# Patient Record
Sex: FEMALE | Race: BLACK OR AFRICAN AMERICAN | NOT HISPANIC OR LATINO | URBAN - METROPOLITAN AREA
[De-identification: names, ages, dates, MRNs, and addresses within clinical notes are randomized per-mention and may not be internally consistent; named-entity substitution may affect disease eponyms.]

---

## 2018-07-14 ENCOUNTER — EMERGENCY (EMERGENCY)
Facility: HOSPITAL | Age: 23
LOS: 0 days | Discharge: HOME | End: 2018-07-15
Attending: EMERGENCY MEDICINE
Payer: COMMERCIAL

## 2018-07-14 ENCOUNTER — EMERGENCY (EMERGENCY)
Facility: HOSPITAL | Age: 23
LOS: 0 days | Discharge: HOME | End: 2018-07-14
Attending: EMERGENCY MEDICINE
Payer: COMMERCIAL

## 2018-07-14 VITALS
SYSTOLIC BLOOD PRESSURE: 132 MMHG | RESPIRATION RATE: 17 BRPM | HEART RATE: 118 BPM | HEIGHT: 64 IN | OXYGEN SATURATION: 100 % | WEIGHT: 134.92 LBS | DIASTOLIC BLOOD PRESSURE: 73 MMHG | TEMPERATURE: 99 F

## 2018-07-14 VITALS
OXYGEN SATURATION: 100 % | TEMPERATURE: 98 F | SYSTOLIC BLOOD PRESSURE: 135 MMHG | RESPIRATION RATE: 20 BRPM | HEART RATE: 100 BPM | DIASTOLIC BLOOD PRESSURE: 71 MMHG

## 2018-07-14 DIAGNOSIS — R53.1 WEAKNESS: ICD-10-CM

## 2018-07-14 DIAGNOSIS — Z88.0 ALLERGY STATUS TO PENICILLIN: ICD-10-CM

## 2018-07-14 DIAGNOSIS — R55 SYNCOPE AND COLLAPSE: ICD-10-CM

## 2018-07-14 DIAGNOSIS — Z79.52 LONG TERM (CURRENT) USE OF SYSTEMIC STEROIDS: ICD-10-CM

## 2018-07-14 DIAGNOSIS — J06.9 ACUTE UPPER RESPIRATORY INFECTION, UNSPECIFIED: ICD-10-CM

## 2018-07-14 DIAGNOSIS — J45.909 UNSPECIFIED ASTHMA, UNCOMPLICATED: ICD-10-CM

## 2018-07-14 DIAGNOSIS — Z79.51 LONG TERM (CURRENT) USE OF INHALED STEROIDS: ICD-10-CM

## 2018-07-14 DIAGNOSIS — Z98.89 OTHER SPECIFIED POSTPROCEDURAL STATES: Chronic | ICD-10-CM

## 2018-07-14 DIAGNOSIS — R06.02 SHORTNESS OF BREATH: ICD-10-CM

## 2018-07-14 DIAGNOSIS — E86.0 DEHYDRATION: ICD-10-CM

## 2018-07-14 DIAGNOSIS — R05 COUGH: ICD-10-CM

## 2018-07-14 DIAGNOSIS — J02.9 ACUTE PHARYNGITIS, UNSPECIFIED: ICD-10-CM

## 2018-07-14 LAB
BASOPHILS # BLD AUTO: 0.02 K/UL — SIGNIFICANT CHANGE UP (ref 0–0.2)
BASOPHILS NFR BLD AUTO: 0.2 % — SIGNIFICANT CHANGE UP (ref 0–2)
EOSINOPHIL # BLD AUTO: 0 K/UL — SIGNIFICANT CHANGE UP (ref 0–0.5)
EOSINOPHIL NFR BLD AUTO: 0 % — SIGNIFICANT CHANGE UP (ref 0–6)
HCT VFR BLD CALC: 40.4 % — SIGNIFICANT CHANGE UP (ref 34.5–45)
HGB BLD-MCNC: 13.9 G/DL — SIGNIFICANT CHANGE UP (ref 11.5–15.5)
IMM GRANULOCYTES NFR BLD AUTO: 0.2 % — SIGNIFICANT CHANGE UP (ref 0–1.5)
LYMPHOCYTES # BLD AUTO: 1.34 K/UL — SIGNIFICANT CHANGE UP (ref 1–3.3)
LYMPHOCYTES # BLD AUTO: 10.6 % — LOW (ref 13–44)
MCHC RBC-ENTMCNC: 30.4 PG — SIGNIFICANT CHANGE UP (ref 27–34)
MCHC RBC-ENTMCNC: 34.4 GM/DL — SIGNIFICANT CHANGE UP (ref 32–36)
MCV RBC AUTO: 88.4 FL — SIGNIFICANT CHANGE UP (ref 80–100)
MONOCYTES # BLD AUTO: 0.96 K/UL — HIGH (ref 0–0.9)
MONOCYTES NFR BLD AUTO: 7.6 % — SIGNIFICANT CHANGE UP (ref 2–14)
NEUTROPHILS # BLD AUTO: 10.24 K/UL — HIGH (ref 1.8–7.4)
NEUTROPHILS NFR BLD AUTO: 81.4 % — HIGH (ref 43–77)
NRBC # BLD: 0 /100 WBCS — SIGNIFICANT CHANGE UP (ref 0–0)
PLATELET # BLD AUTO: 304 K/UL — SIGNIFICANT CHANGE UP (ref 150–400)
RBC # BLD: 4.57 M/UL — SIGNIFICANT CHANGE UP (ref 3.8–5.2)
RBC # FLD: 12.4 % — SIGNIFICANT CHANGE UP (ref 10.3–14.5)
WBC # BLD: 12.59 K/UL — HIGH (ref 3.8–10.5)
WBC # FLD AUTO: 12.59 K/UL — HIGH (ref 3.8–10.5)

## 2018-07-14 PROCEDURE — 99285 EMERGENCY DEPT VISIT HI MDM: CPT

## 2018-07-14 PROCEDURE — 99283 EMERGENCY DEPT VISIT LOW MDM: CPT | Mod: 25

## 2018-07-14 PROCEDURE — 71046 X-RAY EXAM CHEST 2 VIEWS: CPT | Mod: 26

## 2018-07-14 RX ORDER — AZITHROMYCIN 500 MG/1
1 TABLET, FILM COATED ORAL
Qty: 4 | Refills: 0
Start: 2018-07-14 | End: 2018-07-17

## 2018-07-14 RX ORDER — ALBUTEROL 90 UG/1
3 AEROSOL, METERED ORAL
Qty: 30 | Refills: 0
Start: 2018-07-14

## 2018-07-14 RX ORDER — DEXAMETHASONE 0.5 MG/5ML
10 ELIXIR ORAL ONCE
Qty: 0 | Refills: 0 | Status: COMPLETED | OUTPATIENT
Start: 2018-07-14 | End: 2018-07-14

## 2018-07-14 RX ORDER — SODIUM CHLORIDE 9 MG/ML
3 INJECTION INTRAMUSCULAR; INTRAVENOUS; SUBCUTANEOUS ONCE
Qty: 0 | Refills: 0 | Status: COMPLETED | OUTPATIENT
Start: 2018-07-14 | End: 2018-07-14

## 2018-07-14 RX ORDER — AZITHROMYCIN 500 MG/1
500 TABLET, FILM COATED ORAL ONCE
Qty: 0 | Refills: 0 | Status: COMPLETED | OUTPATIENT
Start: 2018-07-14 | End: 2018-07-14

## 2018-07-14 RX ORDER — SODIUM CHLORIDE 9 MG/ML
1000 INJECTION INTRAMUSCULAR; INTRAVENOUS; SUBCUTANEOUS ONCE
Qty: 0 | Refills: 0 | Status: COMPLETED | OUTPATIENT
Start: 2018-07-14 | End: 2018-07-14

## 2018-07-14 RX ADMIN — Medication 10 MILLIGRAM(S): at 01:44

## 2018-07-14 RX ADMIN — SODIUM CHLORIDE 3 MILLILITER(S): 9 INJECTION INTRAMUSCULAR; INTRAVENOUS; SUBCUTANEOUS at 23:39

## 2018-07-14 RX ADMIN — SODIUM CHLORIDE 2000 MILLILITER(S): 9 INJECTION INTRAMUSCULAR; INTRAVENOUS; SUBCUTANEOUS at 23:35

## 2018-07-14 RX ADMIN — AZITHROMYCIN 500 MILLIGRAM(S): 500 TABLET, FILM COATED ORAL at 02:20

## 2018-07-14 NOTE — ED PROVIDER NOTE - NS ED ROS FT
Constitutional: (-) fever  (+)chills  (-)sweats  Eyes/ENT: (-) blurry vision, (-) epistaxis  (-)rhinorrhea   (+) sore throat    Cardiovascular: (-) chest pain, (-) palpitations (-) edema   Respiratory: (-) cough, (+) shortness of breath   Gastrointestinal: (-)nausea  (-)vomiting, (-) diarrhea  (-) abdominal pain   :  (-)dysuria, (-)frequency, (-)urgency, (-)hematuria  Musculoskeletal: (-) neck pain, (-) back pain, (-) joint pain  Integumentary: (-) rash, (-) edema  Neurological: (-) headache, (-) altered mental status  (-)LOC

## 2018-07-14 NOTE — ED PROVIDER NOTE - OBJECTIVE STATEMENT
22yoF; with pmh signif for Asthma; now p/w sob and cough and sore throat x1 week. intermittent fever. denies sob. denies palp. denies sick contacts. denies chest pain. denies abd pain. denies n/v/d  PMH: Asthma  SOCIAL: No tobacco/illicit substance use/EtOH

## 2018-07-14 NOTE — ED PROVIDER NOTE - PHYSICAL EXAMINATION
Gen: Alert, NAD  Head: NC, AT, PERRL, EOMI, normal lids/conjunctiva  ENT: B TM WNL, normal hearing, patent oropharynx with bilateral tonsillar enlargemenbt and erythema. no exudate, uvula midline  Neck: +supple, no tenderness/meningismus/JVD, +Trachea midline  Pulm: Bilateral BS, normal resp effort, no wheeze/stridor/retractions  CV: RRR, no M/R/G, +dist pulses  Abd: soft, NT/ND, +BS, no hepatosplenomegaly  Mskel: no edema/erythema/cyanosis  Skin: no rash  Neuro: AAOx3, no sensory/motor deficits

## 2018-07-14 NOTE — ED ADULT TRIAGE NOTE - CHIEF COMPLAINT QUOTE
BIBA.  pt c/o weakness. pt was seen in ED on yesterday and dsg with URI.  pt currently taking prednisone, albuterol, and zithromax, all taken today.  per EMS - pt was getting a neb treatment at home but was hyperventilating.  LMP 7/2/18

## 2018-07-15 VITALS
HEART RATE: 81 BPM | DIASTOLIC BLOOD PRESSURE: 64 MMHG | SYSTOLIC BLOOD PRESSURE: 134 MMHG | OXYGEN SATURATION: 100 % | TEMPERATURE: 98 F | RESPIRATION RATE: 17 BRPM

## 2018-07-15 LAB
ALBUMIN SERPL ELPH-MCNC: 3.5 G/DL — SIGNIFICANT CHANGE UP (ref 3.3–5)
ALP SERPL-CCNC: 54 U/L — SIGNIFICANT CHANGE UP (ref 40–120)
ALT FLD-CCNC: 15 U/L — SIGNIFICANT CHANGE UP (ref 12–78)
ANION GAP SERPL CALC-SCNC: 8 MMOL/L — SIGNIFICANT CHANGE UP (ref 5–17)
APTT BLD: 27.2 SEC — LOW (ref 27.5–37.4)
AST SERPL-CCNC: 11 U/L — LOW (ref 15–37)
BILIRUB SERPL-MCNC: 0.3 MG/DL — SIGNIFICANT CHANGE UP (ref 0.2–1.2)
BUN SERPL-MCNC: 9 MG/DL — SIGNIFICANT CHANGE UP (ref 7–23)
CALCIUM SERPL-MCNC: 8.8 MG/DL — SIGNIFICANT CHANGE UP (ref 8.5–10.1)
CHLORIDE SERPL-SCNC: 109 MMOL/L — HIGH (ref 96–108)
CK MB BLD-MCNC: <0.6 % — SIGNIFICANT CHANGE UP (ref 0–3.5)
CK MB CFR SERPL CALC: <0.5 NG/ML — SIGNIFICANT CHANGE UP (ref 0.5–3.6)
CK SERPL-CCNC: 83 U/L — SIGNIFICANT CHANGE UP (ref 26–192)
CO2 SERPL-SCNC: 25 MMOL/L — SIGNIFICANT CHANGE UP (ref 22–31)
CREAT SERPL-MCNC: 0.91 MG/DL — SIGNIFICANT CHANGE UP (ref 0.5–1.3)
D DIMER BLD IA.RAPID-MCNC: 1094 NG/ML DDU — HIGH
GLUCOSE SERPL-MCNC: 188 MG/DL — HIGH (ref 70–99)
HCG SERPL-ACNC: <1 MIU/ML — SIGNIFICANT CHANGE UP
INR BLD: 1.02 RATIO — SIGNIFICANT CHANGE UP (ref 0.88–1.16)
LACTATE SERPL-SCNC: 1.6 MMOL/L — SIGNIFICANT CHANGE UP (ref 0.7–2)
LACTATE SERPL-SCNC: 3.4 MMOL/L — HIGH (ref 0.7–2)
POTASSIUM SERPL-MCNC: 3.6 MMOL/L — SIGNIFICANT CHANGE UP (ref 3.5–5.3)
POTASSIUM SERPL-SCNC: 3.6 MMOL/L — SIGNIFICANT CHANGE UP (ref 3.5–5.3)
PROT SERPL-MCNC: 7.9 GM/DL — SIGNIFICANT CHANGE UP (ref 6–8.3)
PROTHROM AB SERPL-ACNC: 11.1 SEC — SIGNIFICANT CHANGE UP (ref 9.8–12.7)
SODIUM SERPL-SCNC: 142 MMOL/L — SIGNIFICANT CHANGE UP (ref 135–145)
TROPONIN I SERPL-MCNC: <.015 NG/ML — SIGNIFICANT CHANGE UP (ref 0.01–0.04)

## 2018-07-15 PROCEDURE — 71045 X-RAY EXAM CHEST 1 VIEW: CPT | Mod: 26

## 2018-07-15 PROCEDURE — 71275 CT ANGIOGRAPHY CHEST: CPT | Mod: 26

## 2018-07-15 RX ORDER — SODIUM CHLORIDE 9 MG/ML
1000 INJECTION INTRAMUSCULAR; INTRAVENOUS; SUBCUTANEOUS ONCE
Qty: 0 | Refills: 0 | Status: COMPLETED | OUTPATIENT
Start: 2018-07-15 | End: 2018-07-15

## 2018-07-15 RX ADMIN — SODIUM CHLORIDE 2000 MILLILITER(S): 9 INJECTION INTRAMUSCULAR; INTRAVENOUS; SUBCUTANEOUS at 00:05

## 2018-07-15 RX ADMIN — SODIUM CHLORIDE 2000 MILLILITER(S): 9 INJECTION INTRAMUSCULAR; INTRAVENOUS; SUBCUTANEOUS at 03:15

## 2018-07-15 NOTE — ED PROVIDER NOTE - OBJECTIVE STATEMENT
22yoF; with pmh signif 22yoF; with pmh signif Asthma, recent URI (placed on Abx, Prednisone); now p/w near-syncope. patient feeling malaised and sob so did not eat all day or drink fluids. was taking nebulizer and felt near-syncopal. family states she was about to pass out and so they brought patient for evaluation. patient states she was feeling palpitations, sob, and lightheadedness. denies f/c/s today. denies n/v. denies abd pain. denies chest pain.  PMH: asthma  SOCIAL: No tobacco/illicit substance use/sEtOH 22yoF; with pmh signif Asthma, recent URI (placed on Abx, Prednisone); now p/w near-syncope. patient feeling malaised and sob so did not eat all day or drink fluids. was taking nebulizer and felt near-syncopal. family states she was about to pass out and so they brought patient for evaluation. patient states she was feeling palpitations, sob, and lightheadedness. denies f/c/s today. denies n/v. denies abd pain. denies chest pain.  PMH: asthma  SOCIAL: No tobacco/illicit substance use/EtOH

## 2018-07-15 NOTE — ED PROVIDER NOTE - PHYSICAL EXAMINATION
Gen: Alert, NAD  Head: NC, AT, PERRL, EOMI, normal lids/conjunctiva  ENT: B TM WNL, normal hearing, patent oropharynx with erythema, no exudate, uvula midline  Neck: +supple, no tenderness/meningismus/JVD, +Trachea midline  Pulm: Bilateral BS, normal resp effort, no wheeze/stridor/retractions  CV: RRR, no M/R/G, +dist pulses  Abd: soft, NT/ND, +BS, no hepatosplenomegaly  Mskel: no edema/erythema/cyanosis  Neuro: grossly intact

## 2018-07-15 NOTE — ED PROVIDER NOTE - CARE PLAN
Principal Discharge DX:	Dehydration  Secondary Diagnosis:	Near syncope  Secondary Diagnosis:	URI, acute

## 2018-07-15 NOTE — ED PROVIDER NOTE - NS ED ROS FT
Constitutional: (-) fever  (-)chills  (-)sweats  Eyes/ENT: (-) blurry vision, (-) epistaxis  (-)rhinorrhea   (-) sore throat    Cardiovascular: (-) chest pain, (+) palpitations (-) edema   Respiratory: (-) cough, (+) shortness of breath   Gastrointestinal: (-)nausea  (-)vomiting, (-) diarrhea  (-) abdominal pain   :  (-)dysuria, (-)frequency, (-)urgency, (-)hematuria  Musculoskeletal: (-) neck pain, (-) back pain, (-) joint pain  Integumentary: (-) rash, (-) edema  Neurological: (-) headache, (-) altered mental status  (-)LOC

## 2018-07-16 LAB
CULTURE RESULTS: SIGNIFICANT CHANGE UP
SPECIMEN SOURCE: SIGNIFICANT CHANGE UP

## 2018-07-20 LAB
CULTURE RESULTS: SIGNIFICANT CHANGE UP
CULTURE RESULTS: SIGNIFICANT CHANGE UP
SPECIMEN SOURCE: SIGNIFICANT CHANGE UP
SPECIMEN SOURCE: SIGNIFICANT CHANGE UP

## 2019-06-16 ENCOUNTER — EMERGENCY (EMERGENCY)
Facility: HOSPITAL | Age: 24
LOS: 0 days | Discharge: HOME | End: 2019-06-16
Attending: EMERGENCY MEDICINE
Payer: COMMERCIAL

## 2019-06-16 VITALS
TEMPERATURE: 98 F | DIASTOLIC BLOOD PRESSURE: 72 MMHG | SYSTOLIC BLOOD PRESSURE: 123 MMHG | WEIGHT: 149.91 LBS | OXYGEN SATURATION: 100 % | HEIGHT: 64 IN | HEART RATE: 112 BPM | RESPIRATION RATE: 17 BRPM

## 2019-06-16 VITALS
RESPIRATION RATE: 16 BRPM | DIASTOLIC BLOOD PRESSURE: 59 MMHG | HEART RATE: 91 BPM | SYSTOLIC BLOOD PRESSURE: 122 MMHG | OXYGEN SATURATION: 100 %

## 2019-06-16 DIAGNOSIS — J45.901 UNSPECIFIED ASTHMA WITH (ACUTE) EXACERBATION: ICD-10-CM

## 2019-06-16 DIAGNOSIS — J45.21 MILD INTERMITTENT ASTHMA WITH (ACUTE) EXACERBATION: ICD-10-CM

## 2019-06-16 DIAGNOSIS — Z98.89 OTHER SPECIFIED POSTPROCEDURAL STATES: Chronic | ICD-10-CM

## 2019-06-16 PROCEDURE — 99284 EMERGENCY DEPT VISIT MOD MDM: CPT | Mod: 25

## 2019-06-16 RX ORDER — ALBUTEROL 90 UG/1
2 AEROSOL, METERED ORAL
Qty: 1 | Refills: 0
Start: 2019-06-16 | End: 2019-06-22

## 2019-06-16 RX ORDER — ALBUTEROL 90 UG/1
3 AEROSOL, METERED ORAL
Qty: 5 | Refills: 0
Start: 2019-06-16 | End: 2019-06-29

## 2019-06-16 RX ORDER — IPRATROPIUM/ALBUTEROL SULFATE 18-103MCG
3 AEROSOL WITH ADAPTER (GRAM) INHALATION
Refills: 0 | Status: COMPLETED | OUTPATIENT
Start: 2019-06-16 | End: 2019-06-16

## 2019-06-16 NOTE — ED PROVIDER NOTE - OBJECTIVE STATEMENT
Pt is a 22 yo lady with a pmhx of asthma never intubated who presents to the ED with sob. She was given 2 rounds of nebs and steroids via ems, and symptoms have now resolved. No chest pain, has had nonproductive cough over the past couple days. No fevers, no chest pain, no abdominal pain.

## 2019-06-16 NOTE — ED ADULT TRIAGE NOTE - CHIEF COMPLAINT QUOTE
BIBA,  asthma excerbation x 90minutes.   12mg Dexamethasone IVP,  duoneb x 2,  no previous intubation

## 2019-06-16 NOTE — ED PROVIDER NOTE - CLINICAL SUMMARY MEDICAL DECISION MAKING FREE TEXT BOX
Ddx: asthma exacerbation  Plan: Pt is feeling better, declines further medications, ok for d/c to fu w pmd.

## 2020-09-22 ENCOUNTER — RESULT REVIEW (OUTPATIENT)
Age: 25
End: 2020-09-22

## 2020-10-24 DIAGNOSIS — Z01.818 ENCOUNTER FOR OTHER PREPROCEDURAL EXAMINATION: ICD-10-CM

## 2020-10-25 ENCOUNTER — APPOINTMENT (OUTPATIENT)
Dept: DISASTER EMERGENCY | Facility: CLINIC | Age: 25
End: 2020-10-25

## 2020-10-25 LAB — SARS-COV-2 N GENE NPH QL NAA+PROBE: NOT DETECTED

## 2020-10-29 ENCOUNTER — APPOINTMENT (OUTPATIENT)
Dept: PULMONOLOGY | Facility: CLINIC | Age: 25
End: 2020-10-29
Payer: COMMERCIAL

## 2020-10-29 ENCOUNTER — LABORATORY RESULT (OUTPATIENT)
Age: 25
End: 2020-10-29

## 2020-10-29 ENCOUNTER — APPOINTMENT (OUTPATIENT)
Dept: RADIOLOGY | Facility: IMAGING CENTER | Age: 25
End: 2020-10-29
Payer: COMMERCIAL

## 2020-10-29 ENCOUNTER — MED ADMIN CHARGE (OUTPATIENT)
Age: 25
End: 2020-10-29

## 2020-10-29 ENCOUNTER — OUTPATIENT (OUTPATIENT)
Dept: OUTPATIENT SERVICES | Facility: HOSPITAL | Age: 25
LOS: 1 days | End: 2020-10-29
Payer: COMMERCIAL

## 2020-10-29 VITALS — RESPIRATION RATE: 16 BRPM | SYSTOLIC BLOOD PRESSURE: 117 MMHG | DIASTOLIC BLOOD PRESSURE: 74 MMHG | HEART RATE: 75 BPM

## 2020-10-29 VITALS
HEART RATE: 70 BPM | WEIGHT: 149 LBS | TEMPERATURE: 98.2 F | HEIGHT: 64 IN | OXYGEN SATURATION: 98 % | BODY MASS INDEX: 25.44 KG/M2

## 2020-10-29 DIAGNOSIS — J45.909 UNSPECIFIED ASTHMA, UNCOMPLICATED: ICD-10-CM

## 2020-10-29 DIAGNOSIS — Z98.89 OTHER SPECIFIED POSTPROCEDURAL STATES: Chronic | ICD-10-CM

## 2020-10-29 DIAGNOSIS — Z23 ENCOUNTER FOR IMMUNIZATION: ICD-10-CM

## 2020-10-29 LAB
25(OH)D3 SERPL-MCNC: 16.4 NG/ML
A1AT SERPL-MCNC: 143 MG/DL
ALBUMIN SERPL ELPH-MCNC: 4.8 G/DL
ALP BLD-CCNC: 55 U/L
ALT SERPL-CCNC: 10 U/L
ANION GAP SERPL CALC-SCNC: 12 MMOL/L
AST SERPL-CCNC: 8 U/L
BASOPHILS # BLD AUTO: 0.03 K/UL
BASOPHILS NFR BLD AUTO: 0.6 %
BILIRUB SERPL-MCNC: 0.7 MG/DL
BUN SERPL-MCNC: 10 MG/DL
CALCIUM SERPL-MCNC: 9.5 MG/DL
CHLORIDE SERPL-SCNC: 108 MMOL/L
CO2 SERPL-SCNC: 23 MMOL/L
CREAT SERPL-MCNC: 0.71 MG/DL
EOSINOPHIL # BLD AUTO: 0.04 K/UL
EOSINOPHIL # BLD MANUAL: 80 /UL
EOSINOPHIL NFR BLD AUTO: 0.8 %
FERRITIN SERPL-MCNC: 14 NG/ML
GLUCOSE SERPL-MCNC: 90 MG/DL
HCT VFR BLD CALC: 41.6 %
HGB BLD-MCNC: 13.6 G/DL
IMM GRANULOCYTES NFR BLD AUTO: 0.2 %
LYMPHOCYTES # BLD AUTO: 1.66 K/UL
LYMPHOCYTES NFR BLD AUTO: 33.2 %
MAN DIFF?: NORMAL
MCHC RBC-ENTMCNC: 29.5 PG
MCHC RBC-ENTMCNC: 32.7 GM/DL
MCV RBC AUTO: 90.2 FL
MONOCYTES # BLD AUTO: 0.3 K/UL
MONOCYTES NFR BLD AUTO: 6 %
NEUTROPHILS # BLD AUTO: 2.96 K/UL
NEUTROPHILS NFR BLD AUTO: 59.2 %
PLATELET # BLD AUTO: 329 K/UL
POTASSIUM SERPL-SCNC: 4.1 MMOL/L
PROT SERPL-MCNC: 7.5 G/DL
RBC # BLD: 4.61 M/UL
RBC # FLD: 12.6 %
SODIUM SERPL-SCNC: 143 MMOL/L
TSH SERPL-ACNC: 0.49 UIU/ML
WBC # FLD AUTO: 5 K/UL

## 2020-10-29 PROCEDURE — 99072 ADDL SUPL MATRL&STAF TM PHE: CPT

## 2020-10-29 PROCEDURE — 70210 X-RAY EXAM OF SINUSES: CPT | Mod: 26

## 2020-10-29 PROCEDURE — 94726 PLETHYSMOGRAPHY LUNG VOLUMES: CPT

## 2020-10-29 PROCEDURE — 71046 X-RAY EXAM CHEST 2 VIEWS: CPT | Mod: 26

## 2020-10-29 PROCEDURE — 94060 EVALUATION OF WHEEZING: CPT

## 2020-10-29 PROCEDURE — ZZZZZ: CPT

## 2020-10-29 PROCEDURE — 94729 DIFFUSING CAPACITY: CPT

## 2020-10-29 PROCEDURE — 71046 X-RAY EXAM CHEST 2 VIEWS: CPT

## 2020-10-29 PROCEDURE — 36415 COLL VENOUS BLD VENIPUNCTURE: CPT

## 2020-10-29 PROCEDURE — 70210 X-RAY EXAM OF SINUSES: CPT

## 2020-10-29 PROCEDURE — 99205 OFFICE O/P NEW HI 60 MIN: CPT | Mod: 25

## 2020-10-29 NOTE — HISTORY OF PRESENT ILLNESS
[TextBox_4] : This letter  is regarding your patient  who  attended pulmonary out patient office today.  I have reviewed  patient's  past history, social history, family history and medication list. I also  reviewed nurse practitioners/ and fellows  notes and assessment and agree with it.  \par The patient was referred by PMD for asthma\par \par Has had asthma since childhood- takes albuetrol MDI and neb, singulair and flonase, non smoker\par last ER visist d/t asthma was summer 2019\par \par ------No history of , fever, chills , rigors, chest pain, or hemoptysis. Questionable history of Raynaud's phenomenon. No h/o significant weight loss in last few months. No history of liver dysfunction , collagen vascular disorder or chronic thromboembolic disease. I would classify the patient's dyspnea as WHO  FUNCTIONAL CLASS II--------\par \par ----Pft date-----10/2020 normal lung volumes----\par -----\par \par

## 2020-10-29 NOTE — DISCUSSION/SUMMARY
[FreeTextEntry1] : ---Assessment plan----------The patient has been referred here for further opinion regarding pulmonary problem,\par 23 yo with asthma\par 1) albuterol rescue inhaler\par 2) symbicort\par 3) labs drawn in our office today\par 4) influenza vac given today \par 5) cxr, sinus xray\par 6) f/u jan 2021\par \par Thanks for allowing  me to participate  in the care of this patient.  Patient at this time  will follow  the above mentioned recommendations and return back for follow up visit. If you have any questions  I can be reached  at #[phone] (beeper)  or  [phone] (office).\par \par Scott Waller MD, FCCP \par Director, Pulmonary Hypertension Program \par [facility] \par Division of Pulmonary, Critical Care and Sleep Medicine \par  Professor of Medicine \par [facility] [employer] [facility] [employer]\par

## 2020-10-30 LAB
SARS-COV-2 IGG SERPL IA-ACNC: 0.32 INDEX
SARS-COV-2 IGG SERPL QL IA: NEGATIVE

## 2020-11-01 LAB
M TB IFN-G BLD-IMP: NEGATIVE
QUANTIFERON TB PLUS MITOGEN MINUS NIL: 6.15 IU/ML
QUANTIFERON TB PLUS NIL: 0.01 IU/ML
QUANTIFERON TB PLUS TB1 MINUS NIL: 0 IU/ML
QUANTIFERON TB PLUS TB2 MINUS NIL: 0 IU/ML

## 2020-11-02 LAB
ALTERN TENCAPG(M6): 5.7 MCG/ML
ASPER FUMCAPG(M3): 15.1 MCG/ML
AUREOBASCAPG(M12): 3.5 MCG/ML
MICROPOLYCAPG(M22): 2.2 MCG/ML
PENIC CHRYCAPG(M1): 12.4 MCG/ML
PHOMA BETAE IGG: 4.9 MCG/ML
THERMOCAPG(M23): 6.2 MCG/ML
TRICHODERMA VIRIDE IGG: 2.7 MCG/ML

## 2020-11-05 LAB
A ALTERNATA IGE QN: 2.26 KUA/L
A FUMIGATUS IGE QN: <0.1 KUA/L
BERMUDA GRASS IGE QN: <0.1 KUA/L
BOXELDER IGE QN: <0.1 KUA/L
C HERBARUM IGE QN: <0.1 KUA/L
CALIF WALNUT IGE QN: <0.1 KUA/L
CAT DANDER IGE QN: <0.1 KUA/L
CMN PIGWEED IGE QN: <0.1 KUA/L
COMMON RAGWEED IGE QN: <0.1 KUA/L
COTTONWOOD IGE QN: <0.1 KUA/L
D FARINAE IGE QN: <0.1 KUA/L
D PTERONYSS IGE QN: <0.1 KUA/L
DEPRECATED A ALTERNATA IGE RAST QL: 2
DEPRECATED A FUMIGATUS IGE RAST QL: 0
DEPRECATED BERMUDA GRASS IGE RAST QL: 0
DEPRECATED BOXELDER IGE RAST QL: 0
DEPRECATED C HERBARUM IGE RAST QL: 0
DEPRECATED CAT DANDER IGE RAST QL: 0
DEPRECATED COMMON PIGWEED IGE RAST QL: 0
DEPRECATED COMMON RAGWEED IGE RAST QL: 0
DEPRECATED COTTONWOOD IGE RAST QL: 0
DEPRECATED D FARINAE IGE RAST QL: 0
DEPRECATED D PTERONYSS IGE RAST QL: 0
DEPRECATED DOG DANDER IGE RAST QL: 0
DEPRECATED GOOSEFOOT IGE RAST QL: 0
DEPRECATED LONDON PLANE IGE RAST QL: 0
DEPRECATED MUGWORT IGE RAST QL: 0
DEPRECATED P NOTATUM IGE RAST QL: 0
DEPRECATED RED CEDAR IGE RAST QL: 0
DEPRECATED ROACH IGE RAST QL: 0
DEPRECATED SHEEP SORREL IGE RAST QL: 0
DEPRECATED SILVER BIRCH IGE RAST QL: 0
DEPRECATED TIMOTHY IGE RAST QL: 0
DEPRECATED WHITE ASH IGE RAST QL: 0
DEPRECATED WHITE OAK IGE RAST QL: 0
DOG DANDER IGE QN: <0.1 KUA/L
GOOSEFOOT IGE QN: <0.1 KUA/L
LONDON PLANE IGE QN: <0.1 KUA/L
MUGWORT IGE QN: <0.1 KUA/L
MULBERRY (T70) CLASS: 0
MULBERRY (T70) CONC: <0.1 KUA/L
P NOTATUM IGE QN: <0.1 KUA/L
RED CEDAR IGE QN: <0.1 KUA/L
ROACH IGE QN: <0.1 KUA/L
SHEEP SORREL IGE QN: <0.1 KUA/L
SILVER BIRCH IGE QN: <0.1 KUA/L
TIMOTHY IGE QN: <0.1 KUA/L
TOTAL IGE SMQN RAST: 20 KU/L
TREE ALLERG MIX1 IGE QL: 0
WHITE ASH IGE QN: <0.1 KUA/L
WHITE ELM IGE QN: 0
WHITE ELM IGE QN: <0.1 KUA/L
WHITE OAK IGE QN: <0.1 KUA/L

## 2021-01-29 ENCOUNTER — APPOINTMENT (OUTPATIENT)
Dept: PULMONOLOGY | Facility: CLINIC | Age: 26
End: 2021-01-29
Payer: COMMERCIAL

## 2021-01-29 VITALS
HEIGHT: 64 IN | RESPIRATION RATE: 15 BRPM | OXYGEN SATURATION: 99 % | HEART RATE: 77 BPM | DIASTOLIC BLOOD PRESSURE: 76 MMHG | WEIGHT: 148 LBS | SYSTOLIC BLOOD PRESSURE: 118 MMHG | TEMPERATURE: 97.9 F | BODY MASS INDEX: 25.27 KG/M2

## 2021-01-29 PROCEDURE — 99072 ADDL SUPL MATRL&STAF TM PHE: CPT

## 2021-01-29 PROCEDURE — 99215 OFFICE O/P EST HI 40 MIN: CPT

## 2021-01-29 NOTE — DISCUSSION/SUMMARY
[FreeTextEntry1] : ---Assessment plan----------The patient has been referred here for further opinion regarding pulmonary problem,\par 25 yo with asthma\par 1) albuterol rescue inhaler\par 2) continue symbicort\par 3) continue singulair and flonase\par 4) f/u may \par \par Thanks for allowing  me to participate  in the care of this patient.  Patient at this time  will follow  the above mentioned recommendations and return back for follow up visit. If you have any questions  I can be reached  at #[phone] (beeper)  or  [phone] (office).\par \par Malena Hernandez ANP-C\par Scott Waller MD, FCCP \par Director, Pulmonary Hypertension Program \par [facility] \par Division of Pulmonary, Critical Care and Sleep Medicine \par  Professor of Medicine \par [facility] [employer]\par

## 2021-05-27 ENCOUNTER — APPOINTMENT (OUTPATIENT)
Dept: PULMONOLOGY | Facility: CLINIC | Age: 26
End: 2021-05-27
Payer: COMMERCIAL

## 2021-05-27 VITALS
OXYGEN SATURATION: 99 % | BODY MASS INDEX: 26.46 KG/M2 | HEIGHT: 64 IN | WEIGHT: 155 LBS | DIASTOLIC BLOOD PRESSURE: 71 MMHG | HEART RATE: 75 BPM | SYSTOLIC BLOOD PRESSURE: 115 MMHG

## 2021-05-27 PROCEDURE — 99215 OFFICE O/P EST HI 40 MIN: CPT

## 2021-05-27 PROCEDURE — 99072 ADDL SUPL MATRL&STAF TM PHE: CPT

## 2021-05-27 NOTE — HISTORY OF PRESENT ILLNESS
[TextBox_4] : This letter  is regarding your patient  who  attended pulmonary out patient office today.  I have reviewed  patient's  past history, social history, family history and medication list. I also  reviewed nurse practitioners/ and fellows  notes and assessment and agree with it.  \par The patient was referred by PMD for asthma\par \par Has had asthma since childhood- takes albuetrol MDI and neb, singulair and flonase, non smoker\par last ER visist d/t asthma was summer 2019\par \par ------No history of , fever, chills , rigors, chest pain, or hemoptysis. Questionable history of Raynaud's phenomenon. No h/o significant weight loss in last few months. No history of liver dysfunction , collagen vascular disorder or chronic thromboembolic disease. I would classify the patient's dyspnea as WHO  FUNCTIONAL CLASS II--------\par \par ----Pft date-----10/2020 normal lung volumes----\par -----\par cxr and sinus xray were normal\par 1/2021- asthma doing well- using symbicort, flonase, singulair and ventolin as needed\par \par 5/2021 asthma well controlled on symbicort- has not used albuterol \par up to date with covid vac\par

## 2021-05-27 NOTE — DISCUSSION/SUMMARY
[FreeTextEntry1] : ---Assessment plan----------The patient has been referred here for further opinion regarding pulmonary problem,\par 23 yo with asthma\par 1) albuterol rescue inhaler\par 2) continue symbicort\par 3) continue singulair and flonase\par 4) up to date with covid vac\par 5) f/u november with pft\par \par Thanks for allowing  me to participate  in the care of this patient.  Patient at this time  will follow  the above mentioned recommendations and return back for follow up visit. If you have any questions  I can be reached  at #[phone] (beeper)  or  [phone] (office).\par \par \par Scott Waller MD, FCCP \par Director, Pulmonary Hypertension Program \par [facility] \par Division of Pulmonary, Critical Care and Sleep Medicine \par  Professor of Medicine \par [facility] [facility] [employer]\par \par MARCEL KothariC\par \par

## 2021-08-08 NOTE — ED ADULT TRIAGE NOTE - BP NONINVASIVE DIASTOLIC (MM HG)
75
Kiran Love)  Surgery  [address], [address]  [place] [address]  Phone: [phone]  Fax: [phone]  Follow Up Time: 2 weeks

## 2022-02-07 ENCOUNTER — EMERGENCY (EMERGENCY)
Facility: HOSPITAL | Age: 27
LOS: 1 days | Discharge: HOME | End: 2022-02-07
Attending: EMERGENCY MEDICINE | Admitting: EMERGENCY MEDICINE
Payer: COMMERCIAL

## 2022-02-07 VITALS
DIASTOLIC BLOOD PRESSURE: 78 MMHG | TEMPERATURE: 98 F | RESPIRATION RATE: 16 BRPM | HEART RATE: 85 BPM | SYSTOLIC BLOOD PRESSURE: 158 MMHG | HEIGHT: 64 IN | OXYGEN SATURATION: 100 %

## 2022-02-07 DIAGNOSIS — Z98.89 OTHER SPECIFIED POSTPROCEDURAL STATES: Chronic | ICD-10-CM

## 2022-02-07 PROCEDURE — 99284 EMERGENCY DEPT VISIT MOD MDM: CPT

## 2022-02-07 NOTE — ED ADULT TRIAGE NOTE - CHIEF COMPLAINT QUOTE
c/o pain d/t bartholin cyst. Pt states she has had it for a year but no intervention needed for it. Denies vaginal bleeding, urinary symptoms. PMHx asthma.

## 2022-02-08 LAB
APPEARANCE UR: CLEAR — SIGNIFICANT CHANGE UP
BILIRUB UR-MCNC: NEGATIVE — SIGNIFICANT CHANGE UP
COLOR SPEC: SIGNIFICANT CHANGE UP
DIFF PNL FLD: NEGATIVE — SIGNIFICANT CHANGE UP
GLUCOSE UR QL: NEGATIVE — SIGNIFICANT CHANGE UP
HIV 1+2 AB+HIV1 P24 AG SERPL QL IA: SIGNIFICANT CHANGE UP
KETONES UR-MCNC: NEGATIVE — SIGNIFICANT CHANGE UP
LEUKOCYTE ESTERASE UR-ACNC: NEGATIVE — SIGNIFICANT CHANGE UP
NITRITE UR-MCNC: NEGATIVE — SIGNIFICANT CHANGE UP
PH UR: 6.5 — SIGNIFICANT CHANGE UP (ref 5–8)
PROT UR-MCNC: NEGATIVE — SIGNIFICANT CHANGE UP
SP GR SPEC: 1.01 — SIGNIFICANT CHANGE UP (ref 1–1.05)
UROBILINOGEN FLD QL: SIGNIFICANT CHANGE UP

## 2022-02-08 RX ORDER — IBUPROFEN 200 MG
600 TABLET ORAL ONCE
Refills: 0 | Status: COMPLETED | OUTPATIENT
Start: 2022-02-08 | End: 2022-02-08

## 2022-02-08 RX ORDER — ACETAMINOPHEN 500 MG
650 TABLET ORAL ONCE
Refills: 0 | Status: COMPLETED | OUTPATIENT
Start: 2022-02-08 | End: 2022-02-08

## 2022-02-08 RX ADMIN — Medication 600 MILLIGRAM(S): at 01:38

## 2022-02-08 RX ADMIN — Medication 650 MILLIGRAM(S): at 01:38

## 2022-02-08 RX ADMIN — Medication 1 TABLET(S): at 01:50

## 2022-02-08 NOTE — ED ADULT NURSE NOTE - OBJECTIVE STATEMENT
Pt received in intake room 4. Pt A&Ox4, presenting with concerns for a Bartholin cyst. States she noticed swelling near L vagina for the last 5 days and had worsening pain. States she has had this cyst several times since september. Denies any vaginal discharge, urinary symptoms. Denies chest pain, sob, abd pain, ha, dizziness, n/v/d, fevers/chills. Respirations even and unlabored, no accessory muscle use. Labs sent as ordered

## 2022-02-08 NOTE — ED PROVIDER NOTE - PROGRESS NOTE DETAILS
Jacky, PGY3 – Pt was re-evaluated at bedside, VSS, some improvement in pain. Results were discussed with patient as well as return precautions and follow up plan with PCP and/or specialist. Time was taken to answer any questions that the patient had before providing them with discharge paperwork.

## 2022-02-08 NOTE — ED PROVIDER NOTE - OBJECTIVE STATEMENT
26F PMH asthma p/w concerns for "Bartholin cyst." Pt reports swelling near L vagina x 4-5 days, a/w pain and worsening swelling. Has been soaking the area. Pt states she's had recurrence of this cyst several times since September. Saw UC/gynecologist and was recommended 1wk courses of abx but continues to recur. Episodes a/w some mild LLQ pain. No f/c, n/v, urinary sx, change in BMs, abnormal vaginal discharge. Also states she had period twice within 2 weeks recently, which is atypical for her.

## 2022-02-08 NOTE — ED PROVIDER NOTE - ATTENDING CONTRIBUTION TO CARE
25 y/o F with h/o asthma here with pain and swelling to L groin area x 3-4 days.  Pt reports recurrent "infections" to this area - seen by outpatient GYN and has completed several courses of abx.  Has been doing sitz baths at home with no relief in pain.  No fever, abd pain, discharge, bleeding, urinary sxs.  No h/o STDs.  Well appearing, lying comfortably in stretcher, awake and alert, nontoxic.  VSS.  Abd soft ntnd.  (+) exam with pustular swelling to L external labia, no fluctuance, no induration, no crepitus.  Appears to be an early abscess, no fluctuance to suggest need for I&D, external to vaginal vault/introitus, thus not bartholin gland cyst.  Will have cont sitz baths, abx for early abscess/cellulitis, outpatient gyn followup for wound check (pt has appt next week).

## 2022-02-08 NOTE — ED PROVIDER NOTE - NSCAREINITIATED _GEN_ER
Maribel Trejo(Attending) Hx R acetabular lesion found incidentally on NM bone scan in setting of recent fall. CT pelvis showed  subacute nondisplaced fractures of the right superior and inferior pubic rami, subacute fracture the right sacral ala. Fat-containing mass within the right gluteus medius muscle.    - no acetabular lesion visualized on CT pelvis today, no indication for IR procedure  - cancer screening up to date, had recent mammogram, no unintentional weight loss, night sweats, fevers

## 2022-02-08 NOTE — ED PROVIDER NOTE - PATIENT PORTAL LINK FT
You can access the FollowMyHealth Patient Portal offered by [facility] by registering at the following website: [email or web address]. By joining [employer]’s FollowMyHealth portal, you will also be able to view your health information using other applications (apps) compatible with our system.

## 2022-02-08 NOTE — ED PROVIDER NOTE - PHYSICAL EXAMINATION
I have reviewed the triage vital signs.  Const: AAOx3, in NAD  Eyes: no conjunctival injection  HENT: NCAT, Neck supple, oral mucosa moist  CV: RRR, +S1, S2  Resp: CTAB, no respiratory distress  GI: Abdomen soft, mild TTP along L femoral crease, no guarding  : no CVA tenderness  : Pelvic exam chaperoned by Dr. Trejo. 2cm induration with TTP lateral to L labia at 5 o clock position, no fluctuance  Extremities: No peripheral edema  Skin: Warm, well perfused, no rash  MSK: No gross deformities appreciated  Neuro: No focal sensory or motor deficits  Psych: Appropriate mood and affect

## 2022-02-08 NOTE — ED PROVIDER NOTE - CLINICAL SUMMARY MEDICAL DECISION MAKING FREE TEXT BOX
Jacky, PGY3 - 26F p/w recurrent L cyst/abscess x 4 days lateral to introitus/labia. Indurated, no fluctuance. Jacky, PGY3 - 26F p/w recurrent L cyst/abscess x 4 days lateral to introitus/labia. Indurated, no fluctuance. VSS. No role for I&D at this time. Pt advised to continue soaks, pain control, will prescribed abx. Pt has upcoming appt with her gynecologist

## 2022-02-08 NOTE — ED PROVIDER NOTE - NSFOLLOWUPINSTRUCTIONS_ED_ALL_ED_FT
Follow up with your gynecologist, as scheduled.    A prescription for Bactrim (antibiotic) was sent to your pharmacy. Take as directed on packaging. Read medication warnings.     Continue soaking the area.    Take Ibuprofen and/or Acetaminophen (Tylenol), as needed for pain, as directed on packaging.     Abscess    An abscess is an infected area that contains a collection of pus and debris. It can occur in almost any part of the body and occurs when the tissue gets infection. Symptoms include a painful mass that is red, warm, tender that might break open and HAVE drainage. If your health care provider gave you antibiotics make sure to take the full course and do not stop even if feeling better.     SEEK IMMEDIATE MEDICAL CARE IF YOU HAVE ANY OF THE FOLLOWING SYMPTOMS: chills, fever, muscle aches, or red streaking from the area.

## 2022-02-08 NOTE — ED PROVIDER NOTE - NS ED ROS FT
General: Denies fevers  Eyes: Denies vision changes  ENMT: Denies sore throat  CV: Denies chest pain  Resp: Denies SOB  GI: +Mild LLQ abdominal pain. Denies nausea, vomiting, diarrhea  : Denies painful urination  Skin: Denies new rashes  Neuro: Denies headache, focal weakness  MSK: Denies recent trauma

## 2022-02-09 LAB
C TRACH RRNA SPEC QL NAA+PROBE: SIGNIFICANT CHANGE UP
CULTURE RESULTS: SIGNIFICANT CHANGE UP
N GONORRHOEA RRNA SPEC QL NAA+PROBE: SIGNIFICANT CHANGE UP
SPECIMEN SOURCE: SIGNIFICANT CHANGE UP
SPECIMEN SOURCE: SIGNIFICANT CHANGE UP

## 2022-05-24 ENCOUNTER — EMERGENCY (EMERGENCY)
Facility: HOSPITAL | Age: 27
LOS: 0 days | Discharge: HOME | End: 2022-05-24
Attending: STUDENT IN AN ORGANIZED HEALTH CARE EDUCATION/TRAINING PROGRAM
Payer: COMMERCIAL

## 2022-05-24 VITALS
TEMPERATURE: 98 F | HEART RATE: 72 BPM | DIASTOLIC BLOOD PRESSURE: 75 MMHG | RESPIRATION RATE: 16 BRPM | SYSTOLIC BLOOD PRESSURE: 120 MMHG | OXYGEN SATURATION: 100 %

## 2022-05-24 VITALS
HEIGHT: 64 IN | HEART RATE: 90 BPM | TEMPERATURE: 98 F | OXYGEN SATURATION: 100 % | SYSTOLIC BLOOD PRESSURE: 118 MMHG | RESPIRATION RATE: 16 BRPM | WEIGHT: 160.06 LBS | DIASTOLIC BLOOD PRESSURE: 75 MMHG

## 2022-05-24 DIAGNOSIS — Z88.0 ALLERGY STATUS TO PENICILLIN: ICD-10-CM

## 2022-05-24 DIAGNOSIS — Z98.89 OTHER SPECIFIED POSTPROCEDURAL STATES: Chronic | ICD-10-CM

## 2022-05-24 DIAGNOSIS — J45.901 UNSPECIFIED ASTHMA WITH (ACUTE) EXACERBATION: ICD-10-CM

## 2022-05-24 DIAGNOSIS — R06.2 WHEEZING: ICD-10-CM

## 2022-05-24 DIAGNOSIS — R06.02 SHORTNESS OF BREATH: ICD-10-CM

## 2022-05-24 LAB
ALBUMIN SERPL ELPH-MCNC: 3.7 G/DL — SIGNIFICANT CHANGE UP (ref 3.3–5)
ALP SERPL-CCNC: 47 U/L — SIGNIFICANT CHANGE UP (ref 40–120)
ALT FLD-CCNC: 32 U/L — SIGNIFICANT CHANGE UP (ref 12–78)
ANION GAP SERPL CALC-SCNC: 6 MMOL/L — SIGNIFICANT CHANGE UP (ref 5–17)
AST SERPL-CCNC: 10 U/L — LOW (ref 15–37)
BASOPHILS # BLD AUTO: 0.04 K/UL — SIGNIFICANT CHANGE UP (ref 0–0.2)
BASOPHILS NFR BLD AUTO: 0.6 % — SIGNIFICANT CHANGE UP (ref 0–2)
BILIRUB SERPL-MCNC: 0.5 MG/DL — SIGNIFICANT CHANGE UP (ref 0.2–1.2)
BUN SERPL-MCNC: 16 MG/DL — SIGNIFICANT CHANGE UP (ref 7–23)
CALCIUM SERPL-MCNC: 9 MG/DL — SIGNIFICANT CHANGE UP (ref 8.5–10.1)
CHLORIDE SERPL-SCNC: 110 MMOL/L — HIGH (ref 96–108)
CO2 SERPL-SCNC: 26 MMOL/L — SIGNIFICANT CHANGE UP (ref 22–31)
CREAT SERPL-MCNC: 0.85 MG/DL — SIGNIFICANT CHANGE UP (ref 0.5–1.3)
EGFR: 98 ML/MIN/1.73M2 — SIGNIFICANT CHANGE UP
EOSINOPHIL # BLD AUTO: 0.04 K/UL — SIGNIFICANT CHANGE UP (ref 0–0.5)
EOSINOPHIL NFR BLD AUTO: 0.6 % — SIGNIFICANT CHANGE UP (ref 0–6)
GLUCOSE SERPL-MCNC: 118 MG/DL — HIGH (ref 70–99)
HCG SERPL-ACNC: <1 MIU/ML — SIGNIFICANT CHANGE UP
HCT VFR BLD CALC: 38.3 % — SIGNIFICANT CHANGE UP (ref 34.5–45)
HGB BLD-MCNC: 13.2 G/DL — SIGNIFICANT CHANGE UP (ref 11.5–15.5)
IMM GRANULOCYTES NFR BLD AUTO: 0.2 % — SIGNIFICANT CHANGE UP (ref 0–1.5)
LYMPHOCYTES # BLD AUTO: 2.69 K/UL — SIGNIFICANT CHANGE UP (ref 1–3.3)
LYMPHOCYTES # BLD AUTO: 41.7 % — SIGNIFICANT CHANGE UP (ref 13–44)
MCHC RBC-ENTMCNC: 30.5 PG — SIGNIFICANT CHANGE UP (ref 27–34)
MCHC RBC-ENTMCNC: 34.5 G/DL — SIGNIFICANT CHANGE UP (ref 32–36)
MCV RBC AUTO: 88.5 FL — SIGNIFICANT CHANGE UP (ref 80–100)
MONOCYTES # BLD AUTO: 0.7 K/UL — SIGNIFICANT CHANGE UP (ref 0–0.9)
MONOCYTES NFR BLD AUTO: 10.9 % — SIGNIFICANT CHANGE UP (ref 2–14)
NEUTROPHILS # BLD AUTO: 2.97 K/UL — SIGNIFICANT CHANGE UP (ref 1.8–7.4)
NEUTROPHILS NFR BLD AUTO: 46 % — SIGNIFICANT CHANGE UP (ref 43–77)
NRBC # BLD: 0 /100 WBCS — SIGNIFICANT CHANGE UP (ref 0–0)
PLATELET # BLD AUTO: 252 K/UL — SIGNIFICANT CHANGE UP (ref 150–400)
POTASSIUM SERPL-MCNC: 3.7 MMOL/L — SIGNIFICANT CHANGE UP (ref 3.5–5.3)
POTASSIUM SERPL-SCNC: 3.7 MMOL/L — SIGNIFICANT CHANGE UP (ref 3.5–5.3)
PROT SERPL-MCNC: 7.1 GM/DL — SIGNIFICANT CHANGE UP (ref 6–8.3)
RBC # BLD: 4.33 M/UL — SIGNIFICANT CHANGE UP (ref 3.8–5.2)
RBC # FLD: 12.4 % — SIGNIFICANT CHANGE UP (ref 10.3–14.5)
SODIUM SERPL-SCNC: 142 MMOL/L — SIGNIFICANT CHANGE UP (ref 135–145)
WBC # BLD: 6.45 K/UL — SIGNIFICANT CHANGE UP (ref 3.8–10.5)
WBC # FLD AUTO: 6.45 K/UL — SIGNIFICANT CHANGE UP (ref 3.8–10.5)

## 2022-05-24 PROCEDURE — 93010 ELECTROCARDIOGRAM REPORT: CPT

## 2022-05-24 PROCEDURE — 99291 CRITICAL CARE FIRST HOUR: CPT

## 2022-05-24 PROCEDURE — 71045 X-RAY EXAM CHEST 1 VIEW: CPT | Mod: 26

## 2022-05-24 RX ORDER — IPRATROPIUM/ALBUTEROL SULFATE 18-103MCG
3 AEROSOL WITH ADAPTER (GRAM) INHALATION ONCE
Refills: 0 | Status: COMPLETED | OUTPATIENT
Start: 2022-05-24 | End: 2022-05-24

## 2022-05-24 RX ORDER — ALBUTEROL 90 UG/1
1 AEROSOL, METERED ORAL ONCE
Refills: 0 | Status: COMPLETED | OUTPATIENT
Start: 2022-05-24 | End: 2022-05-24

## 2022-05-24 RX ORDER — SODIUM CHLORIDE 9 MG/ML
1000 INJECTION INTRAMUSCULAR; INTRAVENOUS; SUBCUTANEOUS ONCE
Refills: 0 | Status: COMPLETED | OUTPATIENT
Start: 2022-05-24 | End: 2022-05-24

## 2022-05-24 RX ADMIN — Medication 3 MILLILITER(S): at 01:36

## 2022-05-24 RX ADMIN — SODIUM CHLORIDE 1000 MILLILITER(S): 9 INJECTION INTRAMUSCULAR; INTRAVENOUS; SUBCUTANEOUS at 01:36

## 2022-05-24 RX ADMIN — Medication 125 MILLIGRAM(S): at 01:35

## 2022-05-24 NOTE — ED PROVIDER NOTE - CRITICAL CARE ATTENDING CONTRIBUTION TO CARE
I have discussed the case with the resident/mid-level provider. I have personally performed a history, physical exam, and my own medical decision making. I have reviewed the note and agree with the findings and plan with the following exceptions: None.    Upon my evaluation, this patient had a high probability of imminent or lifethreatening deterioration due to asthma exacerbation, which required my direct attention, intervention, and personal management.     I have personally provided 30 minutes of critical care time exclusive of time spent on separately billable procedures. Time includes review of laboratory data, radiology results, discussion with consultants, and monitoring for potential decompensation. Interventions were performed as documented above.    - Cipriano Ruiz MD, Emergency Medicine and Medical Toxicology Attending.

## 2022-05-24 NOTE — ED PROVIDER NOTE - PROGRESS NOTE DETAILS
cxr negative ,labs unremarkable, no respiratory distress. good air entry on exam ctabl. I have discussed with the patient about the ED workup, lab results, diagnostics results, plan for discharge home, need for follow-up with primary care physician/specialists, and return precautions. At this time, the patient does not require further workup in the ED. The patient is subjectively feeling better and would like to be discharged home. The patient had the opportunity to ask questions and I have answered all inquiries. The patient verbalizes understanding and agreement with the plan. The patient is hemodynamically stable, clinically well-appearing, ambulatory, mentating well and ready for discharge home.

## 2022-05-24 NOTE — ED PROVIDER NOTE - PATIENT PORTAL LINK FT
You can access the FollowMyHealth Patient Portal offered by [facility] by registering at the following website: [email or web address]. By joining [employer]’s FollowMyHealth portal, you will also be able to view your health information using other applications (apps) compatible with our system.

## 2022-05-24 NOTE — ED PROVIDER NOTE - WR ORDER ID 1
Called patient and he is scheduled with MA at 9:30am on 6/17. Patient appreciative and no further questions or concerns.   [id number]

## 2022-05-24 NOTE — ED ADULT TRIAGE NOTE - CHIEF COMPLAINT QUOTE
c/o asthma exacerbation, palpitations, subjective fever, and weakness. took 2 albuterol puffs at home with relief. lungs clear with EMS

## 2022-05-24 NOTE — ED ADULT NURSE NOTE - OBJECTIVE STATEMENT
c/o asthma exacerbation at 2300 a/w chest tightness and sob, jeanette Patient presents to ED awake, alert and oriented x4 c/o asthma exacerbation at 2300 a/w chest tightness and sob, currently denies pain/sob/chest tightness. No acute respiratory distress noted.

## 2022-05-24 NOTE — ED PROVIDER NOTE - NSFOLLOWUPINSTRUCTIONS_ED_ALL_ED_FT
Rest, drink plenty of fluids.  Advance activity as tolerated.  Continue all previously prescribed medications as directed.  Follow up with your PMD 2-3 days and bring copies of your results.  Return to the ER for worsening symptoms, fevers chest pain, shortness of breath, fainting or new concerning symptoms.    Take acetaminophen 650 mg orally every 6-8 hours for pain control as needed. Please do not exceed 4,000 mg of acetaminophen during a 24 hours period. Acetaminophen can be found in many over-the-counter cold medications as well as opioid medications that may be given for pain.    Take ibuprofen (also known as MOTRIN or ADVIL) 400 mg orally every 6-8 hours for pain control as needed with food to avoid an upset stomach. Ibuprofen can be found in many over-the-counter medications. Please do not take ibuprofen if you have a bleeding disorder, stomach or gastrointestinal ulcer, or liver disease.    If needed, you can alternate these medications so that you can take one medication every 3 hours. For example, at noon take ibuprofen, then at 3PM take acetaminophen, then at 6PM take ibuprofen.

## 2022-05-24 NOTE — ED PROVIDER NOTE - OBJECTIVE STATEMENT
26F PMHx of asthma (no ed, hospitalizations, icu) presenting with shortness of breath x few days. Described as mild wheezing today, triggered by hot weather. Denies any chest pain, abdominal pain, syncope, lightheadedness, nausea/vomiting, falls, trauma, fevers, chills, coughs.

## 2022-07-26 ENCOUNTER — RX RENEWAL (OUTPATIENT)
Age: 27
End: 2022-07-26

## 2022-09-27 ENCOUNTER — MED ADMIN CHARGE (OUTPATIENT)
Age: 27
End: 2022-09-27

## 2022-09-27 ENCOUNTER — APPOINTMENT (OUTPATIENT)
Dept: PULMONOLOGY | Facility: CLINIC | Age: 27
End: 2022-09-27

## 2022-09-27 VITALS
DIASTOLIC BLOOD PRESSURE: 74 MMHG | BODY MASS INDEX: 26.63 KG/M2 | WEIGHT: 156 LBS | TEMPERATURE: 97.5 F | OXYGEN SATURATION: 99 % | HEIGHT: 64 IN | RESPIRATION RATE: 16 BRPM | HEART RATE: 64 BPM | SYSTOLIC BLOOD PRESSURE: 113 MMHG

## 2022-09-27 PROCEDURE — G0008: CPT

## 2022-09-27 PROCEDURE — 99215 OFFICE O/P EST HI 40 MIN: CPT | Mod: 25

## 2022-09-27 PROCEDURE — 90686 IIV4 VACC NO PRSV 0.5 ML IM: CPT

## 2022-09-27 NOTE — HISTORY OF PRESENT ILLNESS
[TextBox_4] : This letter  is regarding your patient  who  attended pulmonary out patient office today.  I have reviewed  patient's  past history, social history, family history and medication list. I also  reviewed nurse practitioners/ and fellows  notes and assessment and agree with it.  \par The patient was referred by PMD for asthma\par \par Has had asthma since childhood- takes albuetrol MDI and neb, singulair and flonase, non smoker\par last ER visist d/t asthma was summer 2019\par \par ------No history of , fever, chills , rigors, chest pain, or hemoptysis. Questionable history of Raynaud's phenomenon. No h/o significant weight loss in last few months. No history of liver dysfunction , collagen vascular disorder or chronic thromboembolic disease. I would classify the patient's dyspnea as WHO  FUNCTIONAL CLASS II--------\par \par ----Pft date-----10/2020 normal lung volumes----\par -----\par cxr and sinus xray were normal\par 1/2021- asthma doing well- using symbicort, flonase, singulair and ventolin as needed\par \par 5/2021 asthma well controlled on symbicort- has not used albuterol \par up to date with covid vac\par \par \par 9/2022 stable asthma- on inhalers\par

## 2022-09-27 NOTE — DISCUSSION/SUMMARY
[FreeTextEntry1] : ---Assessment plan----------The patient has been referred here for further opinion regarding pulmonary problem,\par 25 yo with asthma\par 1) albuterol rescue inhaler\par 2) continue symbicort\par 3) continue singulair and flonase\par 4) up to date with covid vac\par 5) influenza vac given\par 6) f/u 6m with pft\par \par Thanks for allowing  me to participate  in the care of this patient.  Patient at this time  will follow  the above mentioned recommendations and return back for follow up visit. If you have any questions  I can be reached  at #[phone] (beeper)  or  [phone] (office).\par \par \par Scott Waller MD, FCCP \par Director, Pulmonary Hypertension Program \par [facility] \par Division of Pulmonary, Critical Care and Sleep Medicine \par  Professor of Medicine \par [employer]\par \par MARCEL KothariC\par \par

## 2022-11-23 ENCOUNTER — EMERGENCY (EMERGENCY)
Facility: HOSPITAL | Age: 27
LOS: 1 days | Discharge: HOME | End: 2022-11-23
Attending: EMERGENCY MEDICINE | Admitting: EMERGENCY MEDICINE

## 2022-11-23 VITALS
SYSTOLIC BLOOD PRESSURE: 146 MMHG | TEMPERATURE: 98 F | RESPIRATION RATE: 16 BRPM | DIASTOLIC BLOOD PRESSURE: 67 MMHG | HEART RATE: 82 BPM | OXYGEN SATURATION: 100 %

## 2022-11-23 DIAGNOSIS — Z98.89 OTHER SPECIFIED POSTPROCEDURAL STATES: Chronic | ICD-10-CM

## 2022-11-23 LAB
FLUAV AG NPH QL: SIGNIFICANT CHANGE UP
FLUBV AG NPH QL: SIGNIFICANT CHANGE UP
RSV RNA NPH QL NAA+NON-PROBE: SIGNIFICANT CHANGE UP
SARS-COV-2 RNA SPEC QL NAA+PROBE: SIGNIFICANT CHANGE UP

## 2022-11-23 PROCEDURE — 99285 EMERGENCY DEPT VISIT HI MDM: CPT

## 2022-11-23 RX ORDER — ALBUTEROL 90 UG/1
3 AEROSOL, METERED ORAL
Qty: 5 | Refills: 0
Start: 2022-11-23 | End: 2022-12-06

## 2022-11-23 RX ORDER — IPRATROPIUM/ALBUTEROL SULFATE 18-103MCG
2 AEROSOL WITH ADAPTER (GRAM) INHALATION
Refills: 0 | Status: COMPLETED | OUTPATIENT
Start: 2022-11-23 | End: 2022-11-23

## 2022-11-23 RX ADMIN — Medication 2 MILLILITER(S): at 19:26

## 2022-11-23 RX ADMIN — Medication 2 MILLILITER(S): at 19:43

## 2022-11-23 RX ADMIN — Medication 50 MILLIGRAM(S): at 19:43

## 2022-11-23 RX ADMIN — Medication 2 MILLILITER(S): at 19:44

## 2022-11-23 NOTE — ED PROVIDER NOTE - NSFOLLOWUPINSTRUCTIONS_ED_ALL_ED_FT
You were seen in our emergency department for shortness of breath, chest pain, and cough.  Your symptoms are most consistent with an asthma exacerbation.    Please continue to use your albuterol inhaler as previously prescribed.    Use the albuterol nebulizer solution as instructed and prescribed.    Take the steroid (prednisone) as prescribed.    Return to the emergency department if you experience fever, chills, severe shortness of breath, severe pain, worsening symptoms, or any other symptoms that are concerning to you.

## 2022-11-23 NOTE — ED PROVIDER NOTE - PHYSICAL EXAMINATION
Gen: Alert, NAD  Head: NC, AT,  EOMI, normal lids/conjunctiva  ENT:  normal hearing, patent oropharynx without erythema/exudate  Neck: +supple, no tenderness/meningismus/JVD, +Trachea midline  Chest: no chest wall tenderness, equal chest rise  Pulm: Bilateral BS, normal resp effort, no wheeze/stridor/retractions  CV: RRR, no M/R/G, +dist pulses  Abd: +BS, soft, NT/ND  Rectal: deferred  Mskel: no edema/erythema/cyanosis  Skin: no rash  Neuro: AAOx3

## 2022-11-23 NOTE — ED ADULT TRIAGE NOTE - CHIEF COMPLAINT QUOTE
asthma exacerbation since yesterday, a/w chest burning, took inhaler with no relief, RR even and unlabored, no wheezing, speaking clear, no hx of intubations

## 2022-11-23 NOTE — ED PROVIDER NOTE - PATIENT PORTAL LINK FT
You can access the FollowMyHealth Patient Portal offered by [facility] by registering at the following website: [email or web address]. By joining [employer]’s FollowMyHealth portal, you will also be able to view your health information using other applications (apps) compatible with our system.

## 2022-11-23 NOTE — ED PROVIDER NOTE - PROGRESS NOTE DETAILS
Reassessed patient, she states she feels much better.  On lung exam, moving air well, no wheezing.  -Dr. Estes

## 2022-11-23 NOTE — ED PROVIDER NOTE - OBJECTIVE STATEMENT
Pertinent PMH/PSH/FHx/SHx and Review of Systems contained within:  26-year-old female, with past medical history of well-controlled asthma (never intubated, never hospitalized), presents complaining of shortness of breath, chest pain/tightness, and dry cough x2 days.   states symptoms seem to be precipitated by drastic change in weather (went from cold environment to weather to very hot environment at work).  Patient states that shortness of breath worsened when leaving work a few hours ago and symptoms were only mildly improved with albuterol inhaler. Denies smoking  Or illicit drug use.  No PE risk factors.  No ACS risk factors.    No fever/chills, No photophobia/eye pain/changes in vision, No ear pain/sore throat/dysphagia, No palpitations, no stridor, No abdominal pain, No N/V/D, no dysuria/frequency/discharge, No neck/back pain, no rash, no new focal neuro symptoms.

## 2023-01-23 NOTE — DISCUSSION/SUMMARY
[FreeTextEntry1] : ---Assessment plan----------The patient has been referred here for further opinion regarding pulmonary problem,\par 23 yo with asthma\par 1) albuterol rescue inhaler\par 2) continue symbicort\par 3) continue singulair and flonase\par 4) up to date with covid vac\par 5) influenza vac given\par 6) f/u 6m with pft\par \par Thanks for allowing  me to participate  in the care of this patient.  Patient at this time  will follow  the above mentioned recommendations and return back for follow up visit. If you have any questions  I can be reached  at #[phone] (beeper)  or  [phone] (office).\par \par \par Scott Waller MD, FCCP \par Director, Pulmonary Hypertension Program \par [facility] \par Division of Pulmonary, Critical Care and Sleep Medicine \par  Professor of Medicine \par [employer]\par \par MARCEL KothariC\par \par

## 2023-01-23 NOTE — HISTORY OF PRESENT ILLNESS
[Never] : never [TextBox_4] : This letter  is regarding your patient  who  attended pulmonary out patient office today.  I have reviewed  patient's  past history, social history, family history and medication list. I also  reviewed nurse practitioners/ and fellows  notes and assessment and agree with it.  \par The patient was referred by PMD for asthma\par \par Has had asthma since childhood- takes albuetrol MDI and neb, singulair and flonase, non smoker\par last ER visist d/t asthma was summer 2019\par \par ------No history of , fever, chills , rigors, chest pain, or hemoptysis. Questionable history of Raynaud's phenomenon. No h/o significant weight loss in last few months. No history of liver dysfunction , collagen vascular disorder or chronic thromboembolic disease. I would classify the patient's dyspnea as WHO  FUNCTIONAL CLASS II--------\par \par ----Pft date-----10/2020 normal lung volumes----\par -----\par cxr and sinus xray were normal\par 1/2021- asthma doing well- using symbicort, flonase, singulair and ventolin as needed\par \par 5/2021 asthma well controlled on symbicort- has not used albuterol \par up to date with covid vac\par \par \par 9/2022 stable asthma- on inhalers\par

## 2023-01-24 ENCOUNTER — APPOINTMENT (OUTPATIENT)
Dept: PULMONOLOGY | Facility: CLINIC | Age: 28
End: 2023-01-24

## 2023-04-20 NOTE — ED ADULT TRIAGE NOTE - AS TEMP SITE
For ear infection #1, start Amoxicillin     Discussed antibiotic choice, side effects and expected course.   May use probiotic to help reduce diarrhea.  Start antibiotic as directed. If not showing improvement in 3-5 days or if new or worsening symptoms, please call our office.    If worsening wheezing can use albuterol nebulizer every 4 hours as needed.  Call back or return to office if difficulty breathing, shortness of breath, or new symptoms.       oral

## 2023-04-26 ENCOUNTER — EMERGENCY (EMERGENCY)
Facility: HOSPITAL | Age: 28
LOS: 1 days | Discharge: HOME | End: 2023-04-26
Attending: EMERGENCY MEDICINE | Admitting: EMERGENCY MEDICINE
Payer: COMMERCIAL

## 2023-04-26 VITALS
TEMPERATURE: 98 F | DIASTOLIC BLOOD PRESSURE: 80 MMHG | HEART RATE: 51 BPM | RESPIRATION RATE: 16 BRPM | SYSTOLIC BLOOD PRESSURE: 122 MMHG | OXYGEN SATURATION: 100 %

## 2023-04-26 VITALS
RESPIRATION RATE: 22 BRPM | SYSTOLIC BLOOD PRESSURE: 142 MMHG | HEART RATE: 92 BPM | OXYGEN SATURATION: 100 % | TEMPERATURE: 98 F | DIASTOLIC BLOOD PRESSURE: 78 MMHG

## 2023-04-26 DIAGNOSIS — Z98.89 OTHER SPECIFIED POSTPROCEDURAL STATES: Chronic | ICD-10-CM

## 2023-04-26 LAB
ALBUMIN SERPL ELPH-MCNC: 4.2 G/DL — SIGNIFICANT CHANGE UP (ref 3.3–5)
ALP SERPL-CCNC: 50 U/L — SIGNIFICANT CHANGE UP (ref 40–120)
ALT FLD-CCNC: 17 U/L — SIGNIFICANT CHANGE UP (ref 4–33)
ANION GAP SERPL CALC-SCNC: 13 MMOL/L — SIGNIFICANT CHANGE UP (ref 7–14)
APTT BLD: 29 SEC — SIGNIFICANT CHANGE UP (ref 27–36.3)
AST SERPL-CCNC: 10 U/L — SIGNIFICANT CHANGE UP (ref 4–32)
BILIRUB SERPL-MCNC: 0.7 MG/DL — SIGNIFICANT CHANGE UP (ref 0.2–1.2)
BUN SERPL-MCNC: 9 MG/DL — SIGNIFICANT CHANGE UP (ref 7–23)
CALCIUM SERPL-MCNC: 9.2 MG/DL — SIGNIFICANT CHANGE UP (ref 8.4–10.5)
CHLORIDE SERPL-SCNC: 106 MMOL/L — SIGNIFICANT CHANGE UP (ref 98–107)
CO2 SERPL-SCNC: 21 MMOL/L — LOW (ref 22–31)
CREAT SERPL-MCNC: 0.84 MG/DL — SIGNIFICANT CHANGE UP (ref 0.5–1.3)
D DIMER BLD IA.RAPID-MCNC: 350 NG/ML DDU — HIGH
EGFR: 99 ML/MIN/1.73M2 — SIGNIFICANT CHANGE UP
GLUCOSE SERPL-MCNC: 105 MG/DL — HIGH (ref 70–99)
HCG-TM SERPL-ACNC: <1 MIU/ML — SIGNIFICANT CHANGE UP
HCT VFR BLD CALC: 37.1 % — SIGNIFICANT CHANGE UP (ref 34.5–45)
HGB BLD-MCNC: 12.9 G/DL — SIGNIFICANT CHANGE UP (ref 11.5–15.5)
INR BLD: 1.03 RATIO — SIGNIFICANT CHANGE UP (ref 0.88–1.16)
MCHC RBC-ENTMCNC: 31.2 PG — SIGNIFICANT CHANGE UP (ref 27–34)
MCHC RBC-ENTMCNC: 34.8 GM/DL — SIGNIFICANT CHANGE UP (ref 32–36)
MCV RBC AUTO: 89.8 FL — SIGNIFICANT CHANGE UP (ref 80–100)
NRBC # BLD: 0 /100 WBCS — SIGNIFICANT CHANGE UP (ref 0–0)
NRBC # FLD: 0 K/UL — SIGNIFICANT CHANGE UP (ref 0–0)
PLATELET # BLD AUTO: 241 K/UL — SIGNIFICANT CHANGE UP (ref 150–400)
POTASSIUM SERPL-MCNC: 3.3 MMOL/L — LOW (ref 3.5–5.3)
POTASSIUM SERPL-SCNC: 3.3 MMOL/L — LOW (ref 3.5–5.3)
PROT SERPL-MCNC: 7 G/DL — SIGNIFICANT CHANGE UP (ref 6–8.3)
PROTHROM AB SERPL-ACNC: 12 SEC — SIGNIFICANT CHANGE UP (ref 10.5–13.4)
RBC # BLD: 4.13 M/UL — SIGNIFICANT CHANGE UP (ref 3.8–5.2)
RBC # FLD: 12.1 % — SIGNIFICANT CHANGE UP (ref 10.3–14.5)
SODIUM SERPL-SCNC: 140 MMOL/L — SIGNIFICANT CHANGE UP (ref 135–145)
TROPONIN T, HIGH SENSITIVITY RESULT: <6 NG/L — SIGNIFICANT CHANGE UP
WBC # BLD: 7.33 K/UL — SIGNIFICANT CHANGE UP (ref 3.8–10.5)
WBC # FLD AUTO: 7.33 K/UL — SIGNIFICANT CHANGE UP (ref 3.8–10.5)

## 2023-04-26 PROCEDURE — G1004: CPT

## 2023-04-26 PROCEDURE — 71046 X-RAY EXAM CHEST 2 VIEWS: CPT | Mod: 26

## 2023-04-26 PROCEDURE — 71275 CT ANGIOGRAPHY CHEST: CPT | Mod: 26,ME

## 2023-04-26 PROCEDURE — 93010 ELECTROCARDIOGRAM REPORT: CPT

## 2023-04-26 PROCEDURE — 99285 EMERGENCY DEPT VISIT HI MDM: CPT

## 2023-04-26 RX ORDER — ACETAMINOPHEN 500 MG
650 TABLET ORAL ONCE
Refills: 0 | Status: COMPLETED | OUTPATIENT
Start: 2023-04-26 | End: 2023-04-26

## 2023-04-26 RX ORDER — IPRATROPIUM/ALBUTEROL SULFATE 18-103MCG
3 AEROSOL WITH ADAPTER (GRAM) INHALATION ONCE
Refills: 0 | Status: COMPLETED | OUTPATIENT
Start: 2023-04-26 | End: 2023-04-26

## 2023-04-26 RX ADMIN — Medication 3 MILLILITER(S): at 21:19

## 2023-04-26 RX ADMIN — Medication 650 MILLIGRAM(S): at 18:53

## 2023-04-26 RX ADMIN — Medication 650 MILLIGRAM(S): at 21:19

## 2023-04-26 NOTE — ED ADULT TRIAGE NOTE - CHIEF COMPLAINT QUOTE
Pt c/o shortness of breath and chest pain that started 30 minutes ago; pt states feels like it is an asthma attack. Patient denies use of medications. Breathing labored. No adventitious lung sounds noted on assessment. Hx asthma

## 2023-04-26 NOTE — ED PROVIDER NOTE - NSPTACCESSSVCSAPPT_ED_ALL_ED
Assessment/Plan:  1  Primary osteoarthritis of left knee     2  Chronic pain of left knee       Scribe Attestation    I,:   am acting as a scribe while in the presence of the attending physician :       I,:   personally performed the services described in this documentation    as scribed in my presence :         Patricia Bee is a pleasant 78year old who presents to the office today for follow-up evaluation of left knee osteoarthitis  Unfortunately, she has failed to see relief from all forms of conservative treatment which included: NSAIDs, activity modification, use of ambulatory assistive device, and intra-articular cortisone injections  She is interested in undergoing left total knee arthroplasty, but would like to speak with her daughter prior to scheduling  She was counseled to return to the office when ready to schedule her total knee arthroplasty  Of note, she does not drive due to personal/family safety concerns, so she will require in home PT post-operatively  We will obtain Mag marker x-rays of left knee at her pre-op visit  Subjective: left knee pain    Patient ID: Angeline Goodman is a 78 y o  female presents as a follow up with left knee osteoarthritis  At her last visit she was administered a steroid injection to her left knee  She reports no significant improvement from this injection  She continues to have activity related knee pain  Pain is worse over the medial knee, is moderate to severe in intensity, and does not radiate  Pain is worse with activity, better with rest  No numbness or tingling in the extremity  Her current level of knee pain is affecting her daily activities and quality of life  Review of Systems   Constitutional: Positive for activity change  Negative for chills and fever  HENT: Negative  Negative for ear pain and sore throat  Eyes: Negative  Negative for pain and visual disturbance  Respiratory: Negative  Negative for cough and shortness of breath  Cardiovascular: Negative  Negative for chest pain and palpitations  Gastrointestinal: Negative  Negative for abdominal pain and vomiting  Endocrine: Negative  Negative for cold intolerance and heat intolerance  Genitourinary: Negative  Negative for dysuria and hematuria  Musculoskeletal: Positive for arthralgias  Negative for back pain  Skin: Negative  Negative for color change and rash  Allergic/Immunologic: Negative  Negative for immunocompromised state  Neurological: Negative  Negative for seizures and syncope  Hematological: Negative  Does not bruise/bleed easily  Psychiatric/Behavioral: Negative  Negative for agitation, behavioral problems and confusion  All other systems reviewed and are negative  Past Medical History:   Diagnosis Date    Anesthesia complication     difficulty waking up    Arthritis     left knee    Bone spur     left knee behing the knee cap    Chronic kidney disease     Colon cancer [facility])     patient states about 2017    Colon polyp     Tubulovillous Adenoma High Grade Dysplasia - 2017    Depression     Diabetes mellitus ([facility] [address] )     Endometrial cancer ([facility] [address] )     grade I    Hx of bleeding disorder     vaginal bleeding started on 3/13/17     Hyperlipidemia     Hypertension     PONV (postoperative nausea and vomiting)     Shortness of breath     with exertion    Urinary incontinence     Vitamin D deficiency        Past Surgical History:   Procedure Laterality Date    BREAST BIOPSY Left     benign-maybe at ar age 61    CHOLECYSTECTOMY      open    COLONOSCOPY      DILATION AND CURETTAGE OF UTERUS N/A 4/5/2017    Procedure: DILATATION AND CURETTAGE (D&C);   Surgeon: Leila Fraser MD;  Location: [facility] MAIN OR;  Service:     HYSTERECTOMY      OOPHORECTOMY      PELVIC LAPAROSCOPY      MO LAPAROSCOPY W TOT HYSTERECTUTERUS <=250 GRAM  W TUBE/OVARY N/A 5/4/2017    Procedure: ROBOTIC ASSISTED TOTAL LAPAROSCOPIC HYSTERECTOMY, BILATERAL SALPINGOOPHERECTOMY; CYSTOSCOPY;  Surgeon: Arben De La O MD;  [facility] MAIN [facility];  Service: Gynecology Oncology    TONSILLECTOMY      TUBAL LIGATION         Family History   Problem Relation Age of Onset    Cancer Mother         Renal    Heart disease Father         CHF    Heart disease Sister         atrial septal defect    Breast cancer Paternal Aunt 39       Social History     Occupational History    Not on file   Tobacco Use    Smoking status: Never Smoker    Smokeless tobacco: Never Used   Vaping Use    Vaping Use: Never used   Substance and Sexual Activity    Alcohol use: No    Drug use: No    Sexual activity: Not on file         Current Outpatient Medications:     BD PEN NEEDLE JOSEPHINE U/F 32G X 4 MM MISC, USE 4 TIMES DAILY WITH INSULIN, Disp: , Rfl: 10    buPROPion (WELLBUTRIN SR) 200 MG 12 hr tablet, Take 200 mg by mouth every morning, Disp: , Rfl:     clonazePAM (KlonoPIN) 0 25 MG disintegrating tablet, Take 0 25 mg by mouth every morning (Patient not taking: Reported on 4/8/2022 ), Disp: , Rfl:     clonazePAM (KlonoPIN) 0 5 mg tablet, Take 0 5 mg by mouth daily after lunch 0 5 tablet in morning, one whole tablet around 3 pm  , Disp: , Rfl:     DOCUSATE SODIUM PO, , Disp: , Rfl:     EQ Aspirin Adult Low Dose 81 MG EC tablet, Take 1 tablet by mouth once daily with breakfast, Disp: 90 tablet, Rfl: 0    Insulin Glargine (TOUJEO SOLOSTAR SC), Inject 70 Units under the skin every morning LANTUS, Disp: , Rfl:     lisinopril (ZESTRIL) 2 5 mg tablet, Take 2 5 mg by mouth daily, Disp: , Rfl: 3    metFORMIN (GLUCOPHAGE-XR) 500 mg 24 hr tablet, Take 500 mg by mouth 2 (two) times a day, Disp: , Rfl: 5    metoprolol succinate (TOPROL-XL) 25 mg 24 hr tablet, Take 1 tablet (25 mg total) by mouth daily, Disp: 90 tablet, Rfl: 3    Multiple Vitamins-Minerals (MULTIVITAMIN ADULT PO), , Disp: , Rfl:     nitroglycerin (NITROSTAT) 0 4 mg SL tablet, Place 1 tablet (0 4 mg total) under the tongue every 5 (five) minutes as needed for chest pain, Disp: 30 tablet, Rfl: 1    Omega-3 Fatty Acids (fish oil) 1,000 mg, Take 1 capsule (1,000 mg total) by mouth 2 (two) times a day, Disp: 60 capsule, Rfl: 0    rosuvastatin (CRESTOR) 20 MG tablet, Take 1 tablet (20 mg total) by mouth daily, Disp: 90 tablet, Rfl: 3    sertraline (ZOLOFT) 100 mg tablet, Take 100 mg by mouth 2 (two) times a day, Disp: , Rfl:     Vitamin D, Cholecalciferol, 1000 units CAPS, Take 1,000 Units by mouth every morning   (Patient not taking: Reported on 9/2/2021), Disp: , Rfl:     ziprasidone (GEODON) 40 mg capsule, Take 40 mg by mouth every evening, Disp: , Rfl:     ziprasidone (GEODON) 80 mg capsule, Take 80 mg by mouth 2 (two) times a day with meals, Disp: , Rfl:     ziprasidone (GEODON) 80 mg capsule, Take 1 capsule by mouth 2 (two) times a day (Patient not taking: Reported on 9/2/2021), Disp: , Rfl:     No Known Allergies    Objective:  Vitals:    09/28/22 1139   BP: 130/76   Pulse: 80       Body mass index is 33 13 kg/m²  Left Knee Exam     Muscle Strength   The patient has normal left knee strength  Tenderness   The patient is experiencing tenderness in the medial joint line  Range of Motion   Extension: 0   Flexion: 110     Tests   Varus: negative Valgus: negative  Lachman:  Anterior - negative      Drawer:  Posterior - negative    Other   Erythema: present  Scars: absent  Sensation: normal  Pulse: present  Swelling: none  Effusion: no effusion present          Observations   Left Knee   Negative for effusion  Physical Exam  Vitals reviewed  Constitutional:       General: She is not in acute distress  Appearance: Normal appearance  HENT:      Head: Normocephalic and atraumatic  Right Ear: External ear normal       Left Ear: External ear normal       Nose: Nose normal       Mouth/Throat:      Mouth: Mucous membranes are moist       Pharynx: No posterior oropharyngeal erythema     Eyes:      General: Right eye: No discharge  Left eye: No discharge  Extraocular Movements: Extraocular movements intact  Conjunctiva/sclera: Conjunctivae normal    Cardiovascular:      Rate and Rhythm: Normal rate and regular rhythm  Pulses: Normal pulses  Pulmonary:      Effort: Pulmonary effort is normal  No respiratory distress  Breath sounds: No wheezing  Abdominal:      General: Abdomen is flat  Tenderness: There is no guarding  Musculoskeletal:      Cervical back: Normal range of motion  No rigidity  Left knee: No effusion  Comments: See ortho exam   Skin:     General: Skin is warm and dry  Capillary Refill: Capillary refill takes less than 2 seconds  Findings: No erythema or rash  Neurological:      General: No focal deficit present  Mental Status: She is alert and oriented to person, place, and time  Mental status is at baseline  Sensory: No sensory deficit  Motor: No weakness  Psychiatric:         Mood and Affect: Mood normal          Behavior: Behavior normal          Thought Content: Thought content normal          Judgment: Judgment normal          I have personally reviewed pertinent films in PACS  xrays of the left knee shows severe knee osteoarthritis  With bone-on-bone contact, sclerosis, peripheral osteophyte formation  Specialty Care (Immediate)...

## 2023-04-26 NOTE — ED PROVIDER NOTE - NSFOLLOWUPINSTRUCTIONS_ED_ALL_ED_FT
The CT scan of your chest did not show any evidence of an infection, pulmonary embolus which is a blood clot in your lung, fluid in your lung.  It did show a small 4 mm nodule which is likely of no significance but will require a follow-up in the next year for repeat imaging to ensure that it is not growing and is not cancerous.  Our discharge planners will reach out to you to help you get set up with an appointment with a pulmonologist.  Please return to the emergency department if you have worsening chest pain or difficulty breathing.

## 2023-04-26 NOTE — ED ADULT NURSE REASSESSMENT NOTE - NS ED NURSE REASSESS COMMENT FT1
Pt in INT 5. A&Ox4, amb at baseline. Vitally stable. Denies SOB, nausea, vomiting, headache, dizziness, numbness/tingling to hands/feet. Endorsing mild chest pain 4/10 nonradiating. States symptom improve since arrival. Awaiting CTA. Safety maintained awaiting orders
Pt a&ox4, ambulatory, denies CP, SOB or dyspnea at this time. Respirations are even and unlabored, no signs of respiratory distress.

## 2023-04-26 NOTE — ED PROVIDER NOTE - CLINICAL SUMMARY MEDICAL DECISION MAKING FREE TEXT BOX
27-year-old female history of mild intermittent asthma presents emergency department with dry cough times few days and 1 day of left-sided pleuritic chest pain and shortness of breath. Pt appears uncomfortable, no respiratory distress byt tachypneic to 22, no wheezing. EKG w/o ischemia,  sinus tach to 96. Plan for labs with d-dimer to screen for PE, cxr to eval for pna/fluids/pneumothorax, nebs, reassess.

## 2023-04-26 NOTE — ED PROVIDER NOTE - PHYSICAL EXAMINATION
GEN - NAD; well appearing; A+O x3   HEAD - NC/AT   EYES- PERRL, EOMI  ENT: Airway patent, mmm  NECK: Neck supple  PULMONARY - CTA b/l, symmetric breath sounds.   CARDIAC -s1s2, RRR, no M,G,R  ABDOMEN - +BS, ND, NT, soft, no guarding, no rebound, no masses   EXTREMITIES - FROM,  no edema   NEUROLOGIC - alert, speech clear  PSYCH -nl mood/affect, nl insight.

## 2023-04-26 NOTE — ED PROVIDER NOTE - OBJECTIVE STATEMENT
27-year-old female history of mild intermittent asthma presents emergency department with dry cough times few days and 1 day of left-sided pleuritic chest pain and shortness of breath.  Patient states this occurred at rest she used her nebulizer but chest pain is worsening so she presented to the ED.  Denies fevers, leg pain or leg swelling, OCP use.

## 2023-04-26 NOTE — ED PROVIDER NOTE - PROGRESS NOTE DETAILS
Pt feeling much improved after nebs however d-dimer positive, will obtain CTA to definitively r/o PE

## 2023-04-26 NOTE — ED ADULT NURSE NOTE - OBJECTIVE STATEMENT
A&Ox4. ambulatory. c/o SOB and chest pain. NAD. PT is talking in full sentences and no drooling observed. NAD. pt denies SOB, chest pain, dizziness, weakness, urinary symptoms, HA, n/v/d, fevers, chills. respirations are even and un labored. skin intact. 20g placed to LAC. labs drawn and sent. safety precautions maintained.

## 2023-04-26 NOTE — ED PROVIDER NOTE - PATIENT PORTAL LINK FT
You can access the FollowMyHealth Patient Portal offered by [facility] by registering at the following website: [email or web address]. By joining [employer]’s FollowMyHealth portal, you will also be able to view your health information using other applications (apps) compatible with our system.

## 2023-05-04 ENCOUNTER — APPOINTMENT (OUTPATIENT)
Dept: PULMONOLOGY | Facility: CLINIC | Age: 28
End: 2023-05-04
Payer: COMMERCIAL

## 2023-05-04 VITALS
WEIGHT: 170 LBS | SYSTOLIC BLOOD PRESSURE: 127 MMHG | BODY MASS INDEX: 29.02 KG/M2 | HEIGHT: 64 IN | OXYGEN SATURATION: 100 % | HEART RATE: 89 BPM | DIASTOLIC BLOOD PRESSURE: 78 MMHG | RESPIRATION RATE: 15 BRPM | TEMPERATURE: 97 F

## 2023-05-04 DIAGNOSIS — Z82.69 FAMILY HISTORY OF OTHER DISEASES OF THE MUSCULOSKELETAL SYSTEM AND CONNECTIVE TISSUE: ICD-10-CM

## 2023-05-04 DIAGNOSIS — Z00.00 ENCOUNTER FOR GENERAL ADULT MEDICAL EXAMINATION W/OUT ABNORMAL FINDINGS: ICD-10-CM

## 2023-05-04 PROCEDURE — 99213 OFFICE O/P EST LOW 20 MIN: CPT

## 2023-05-04 RX ORDER — MONTELUKAST 10 MG/1
10 TABLET, FILM COATED ORAL
Qty: 1 | Refills: 5 | Status: DISCONTINUED | COMMUNITY
Start: 2021-01-29 | End: 2023-05-04

## 2023-05-04 NOTE — REASON FOR VISIT
[Abnormal CXR/ Chest CT] : an abnormal CXR/ chest CT [Chest Pain] : chest pain [Asthma] : asthma [Shortness of Breath] : shortness of breath [Spouse] : spouse

## 2023-05-04 NOTE — PHYSICAL EXAM
[No Acute Distress] : no acute distress [Normal Rate/Rhythm] : normal rate/rhythm [Normal S1, S2] : normal s1, s2 [No Murmurs] : no murmurs [No Resp Distress] : no resp distress [Clear to Auscultation Bilaterally] : clear to auscultation bilaterally [No Abnormalities] : no abnormalities [No Edema] : no edema

## 2023-05-05 NOTE — ASSESSMENT
[FreeTextEntry1] : 26yo F pmh Asthma presents for f/u after being evaluated in the [facility] ED for chest pain and dyspnea. Lung nodule seen on CTA is <6mm, patient has no smoking history, so no need for enhanced surveillance. DAVIS symptoms are not entirely c/w asthma, but patient has only been using rescue inhaler. Also notable that EKG in ED showed borderline LA enlargement. Differential for dyspnea includes asthma vs CHF vs autoimmune process.\par \par #Incidental Lung Nodule 4mm\par -no need for additional surveillance\par \par #Dyspnea on Exertion\par -Refill symbicort, stressed necessity of taking every day\par -Repeat PFTs\par -f/u TTE\par -f/u ESR, CRP, BINA

## 2023-05-05 NOTE — REVIEW OF SYSTEMS
[Dyspnea] : dyspnea [Pleuritic Pain] : pleuritic pain [SOB on Exertion] : sob on exertion [Chest Discomfort] : chest discomfort [Seasonal Allergies] : seasonal allergies [Negative] : Genitourinary [Cough] : no cough [Sputum] : no sputum

## 2023-05-05 NOTE — HISTORY OF PRESENT ILLNESS
[Never] : never [TextBox_4] : 26yo F pmh Asthma presents for f/u after being evaluated in the [facility] ED for chest pain and dyspnea. During this evaluation, she had a CTA Chest showing a new 3.7mm lung nodule. Patient states that ~1 month ago she woke up and had significant DAVIS; she now becomes SOB after walking 10-15ft or climbing ~4 steps, however she states that she does not have to stop to catch her breath. She denies wheezing when this occurs, and states that her albuterol rescue inhaler does not help. She is now using her albuterol inhaler 1-2x per day 3-4 days per week. Notably, patient was previously on Symbicort, but has not been taking the medication for a prolonged time. Starting several days ago, she also began to have chest pain, which prompted her to present to the ED. The chest pain has since improved. She describes it as sharp, localized along the lower part of her ribs on the left side, and worse with deep inspiration. She has not had any heart palpitations, dizziness, or presyncopal symptoms.

## 2023-05-09 LAB
ANA SER IF-ACNC: NEGATIVE
CRP SERPL-MCNC: <3 MG/L
ERYTHROCYTE [SEDIMENTATION RATE] IN BLOOD BY WESTERGREN METHOD: 15 MM/HR

## 2023-07-05 ENCOUNTER — APPOINTMENT (OUTPATIENT)
Dept: PULMONOLOGY | Facility: CLINIC | Age: 28
End: 2023-07-05

## 2023-07-07 ENCOUNTER — APPOINTMENT (OUTPATIENT)
Dept: ANTEPARTUM | Facility: CLINIC | Age: 28
End: 2023-07-07
Payer: COMMERCIAL

## 2023-07-07 ENCOUNTER — LABORATORY RESULT (OUTPATIENT)
Age: 28
End: 2023-07-07

## 2023-07-07 ENCOUNTER — ASOB RESULT (OUTPATIENT)
Age: 28
End: 2023-07-07

## 2023-07-07 ENCOUNTER — NON-APPOINTMENT (OUTPATIENT)
Age: 28
End: 2023-07-07

## 2023-07-07 PROCEDURE — 76816 OB US FOLLOW-UP PER FETUS: CPT | Mod: 59

## 2023-07-07 PROCEDURE — 76814 OB US NUCHAL MEAS ADD-ON: CPT

## 2023-07-07 PROCEDURE — 76813 OB US NUCHAL MEAS 1 GEST: CPT

## 2023-08-03 ENCOUNTER — APPOINTMENT (OUTPATIENT)
Dept: ANTEPARTUM | Facility: CLINIC | Age: 28
End: 2023-08-03
Payer: COMMERCIAL

## 2023-08-03 ENCOUNTER — ASOB RESULT (OUTPATIENT)
Age: 28
End: 2023-08-03

## 2023-08-03 PROCEDURE — 99202 OFFICE O/P NEW SF 15 MIN: CPT | Mod: 25

## 2023-08-03 PROCEDURE — 76816 OB US FOLLOW-UP PER FETUS: CPT | Mod: 59

## 2023-08-03 PROCEDURE — 76817 TRANSVAGINAL US OBSTETRIC: CPT

## 2023-08-04 ENCOUNTER — OUTPATIENT (OUTPATIENT)
Dept: INPATIENT UNIT | Facility: HOSPITAL | Age: 28
LOS: 1 days | Discharge: HOME | End: 2023-08-04
Payer: COMMERCIAL

## 2023-08-04 VITALS
TEMPERATURE: 100 F | DIASTOLIC BLOOD PRESSURE: 58 MMHG | SYSTOLIC BLOOD PRESSURE: 134 MMHG | RESPIRATION RATE: 16 BRPM | HEART RATE: 101 BPM

## 2023-08-04 DIAGNOSIS — O26.899 OTHER SPECIFIED PREGNANCY RELATED CONDITIONS, UNSPECIFIED TRIMESTER: ICD-10-CM

## 2023-08-04 DIAGNOSIS — Z98.89 OTHER SPECIFIED POSTPROCEDURAL STATES: Chronic | ICD-10-CM

## 2023-08-04 PROCEDURE — 99221 1ST HOSP IP/OBS SF/LOW 40: CPT

## 2023-08-05 VITALS — SYSTOLIC BLOOD PRESSURE: 132 MMHG | HEART RATE: 83 BPM | DIASTOLIC BLOOD PRESSURE: 60 MMHG

## 2023-08-05 LAB
APPEARANCE UR: CLEAR — SIGNIFICANT CHANGE UP
BILIRUB UR-MCNC: NEGATIVE — SIGNIFICANT CHANGE UP
COLOR SPEC: YELLOW — SIGNIFICANT CHANGE UP
DIFF PNL FLD: NEGATIVE — SIGNIFICANT CHANGE UP
GLUCOSE UR QL: NEGATIVE MG/DL — SIGNIFICANT CHANGE UP
KETONES UR-MCNC: NEGATIVE MG/DL — SIGNIFICANT CHANGE UP
LEUKOCYTE ESTERASE UR-ACNC: NEGATIVE — SIGNIFICANT CHANGE UP
NITRITE UR-MCNC: NEGATIVE — SIGNIFICANT CHANGE UP
PH UR: 7 — SIGNIFICANT CHANGE UP (ref 5–8)
PROT UR-MCNC: NEGATIVE MG/DL — SIGNIFICANT CHANGE UP
SP GR SPEC: 1.01 — SIGNIFICANT CHANGE UP (ref 1–1.03)
UROBILINOGEN FLD QL: 0.2 MG/DL — SIGNIFICANT CHANGE UP (ref 0.2–1)

## 2023-08-05 NOTE — OB PROVIDER TRIAGE NOTE - ADDITIONAL INSTRUCTIONS
Follow up at next scheduled prenatal appointment  Return for decreased fetal movement, loss of fluid or vaginal bleeding  Increase oral hydration

## 2023-08-05 NOTE — OB PROVIDER TRIAGE NOTE - HISTORY OF PRESENT ILLNESS
28y/o  TIUP, Di-Di @15.5wks presents s/p her niece fell onto her abdomen on the left side at 0, patient reports earlier pain but denies currently  Feel flutters  Denies LOF/VB    Allergies: PCN- Anaphylaxis   Medications: PNV, Albuterol PRN    Medical HX: Asthma  Surgical HX: Eye surgery   Denies etoh/smoke/drugs/psy

## 2023-08-05 NOTE — OB PROVIDER TRIAGE NOTE - NSHPPHYSICALEXAM_GEN_ALL_CORE
Vital Signs Last 24 Hrs  T(C): 37.5 (04 Aug 2023 22:28), Max: 37.5 (04 Aug 2023 22:28)  T(F): 99.5 (04 Aug 2023 22:28), Max: 99.5 (04 Aug 2023 22:28)  HR: 85 (05 Aug 2023 01:28) (85 - 101)  BP: 121/62 (05 Aug 2023 01:28) (121/62 - 134/58)  RR: 16 (05 Aug 2023 01:28) (16 - 16)      Assessment reveals VSS  General: Female sitting comfortably in no apparent distress  Neuro: No facial asymmetry, no slurred speech, moves all 4 extremities  Mood: Alert and lucid, appropriate mood and affect  A&Ox3  Lungs- clear bilateral  Heart- normal rate and regular rhythm  Extremities- Warm, Dry, no edema present, good pulses   Abdomen soft, NT, gravid  Transabdominal Ultrasound- images saved ASOB, Baby A- breech, post placenta, FHR: 150, good fetal movement, good fluid Baby B- transverse, [id number], post placenta, good fetal movement, good fluid  Sterile Speculum- cervix appears closed   Transvaginal Ultrasound- cervical length 3.9-4.0, no funneling or dynamic changes       PLAN:  Awaiting Type and Screen from lab

## 2023-08-05 NOTE — OB PROVIDER TRIAGE NOTE - PLAN OF CARE
D/C Home  D/W Dr. Landa  No evidence of acute process  Normal fetal testing x2  Follow up at next scheduled prenatal appointment  Return for decreased fetal movement, loss of fluid or vaginal bleeding  Increase oral hydration

## 2023-08-05 NOTE — OB PROVIDER TRIAGE NOTE - TERM DELIVERIES, OB PROFILE
Enter Query Response Below      Query Response:        Sepsis, present on admit          If applicable, please update the problem list.     Patient: Jalen Lockwood        : 1949  Account: [id number]           Admit Date: 10/31/2022        How to Respond to this query:       a. Click New Note     b. Answer query within the yellow box.                c. Update the Problem List, if applicable.      If you have any questions about this query contact me at: [email or web address]     Dr. Regan:     Patient admitted on 10/31 with urinary tract infection. Patient presented with a lactate level of 1.3 and procalcitonin level of 2.30. Physician progress note on  listed sepsis as a diagnosis. Patient was treated with IV Merrem and continuous IV fluids.     After study, can you further clarify the sepsis as:    Sepsis, present on admit   Sepsis, developed after admit  Other (please specify)________________  Clinically indeterminable     By submitting this query, we are merely seeking further clarification of documentation to accurately reflect all conditions that you are monitoring, evaluating, treating or that extend the hospitalization or utilize additional resources of care. Please utilize your independent clinical judgment when addressing the question(s) above.     This query and your response, once completed, will be entered into the legal medical record.    Sincerely,  Elena SANTOS RN, CCDS  Clinical Documentation Integrity Program    0

## 2023-08-07 DIAGNOSIS — R10.2 PELVIC AND PERINEAL PAIN: ICD-10-CM

## 2023-08-07 DIAGNOSIS — J45.909 UNSPECIFIED ASTHMA, UNCOMPLICATED: ICD-10-CM

## 2023-08-07 DIAGNOSIS — O30.042 TWIN PREGNANCY, DICHORIONIC/DIAMNIOTIC, SECOND TRIMESTER: ICD-10-CM

## 2023-08-07 DIAGNOSIS — Z3A.15 15 WEEKS GESTATION OF PREGNANCY: ICD-10-CM

## 2023-08-07 DIAGNOSIS — O99.512 DISEASES OF THE RESPIRATORY SYSTEM COMPLICATING PREGNANCY, SECOND TRIMESTER: ICD-10-CM

## 2023-08-07 DIAGNOSIS — O26.892 OTHER SPECIFIED PREGNANCY RELATED CONDITIONS, SECOND TRIMESTER: ICD-10-CM

## 2023-08-07 DIAGNOSIS — O26.893 OTHER SPECIFIED PREGNANCY RELATED CONDITIONS, THIRD TRIMESTER: ICD-10-CM

## 2023-08-16 ENCOUNTER — APPOINTMENT (OUTPATIENT)
Dept: PULMONOLOGY | Facility: CLINIC | Age: 28
End: 2023-08-16
Payer: COMMERCIAL

## 2023-08-16 VITALS
DIASTOLIC BLOOD PRESSURE: 78 MMHG | HEIGHT: 64 IN | BODY MASS INDEX: 30.39 KG/M2 | WEIGHT: 178 LBS | OXYGEN SATURATION: 100 % | HEART RATE: 103 BPM | SYSTOLIC BLOOD PRESSURE: 130 MMHG

## 2023-08-16 DIAGNOSIS — J45.909 UNSPECIFIED ASTHMA, UNCOMPLICATED: ICD-10-CM

## 2023-08-16 PROCEDURE — 94060 EVALUATION OF WHEEZING: CPT

## 2023-08-16 PROCEDURE — 94729 DIFFUSING CAPACITY: CPT

## 2023-08-16 PROCEDURE — 99213 OFFICE O/P EST LOW 20 MIN: CPT | Mod: 25

## 2023-08-16 PROCEDURE — ZZZZZ: CPT

## 2023-08-16 PROCEDURE — 94726 PLETHYSMOGRAPHY LUNG VOLUMES: CPT

## 2023-08-16 NOTE — PHYSICAL EXAM
[No Acute Distress] : no acute distress [Normal Oropharynx] : normal oropharynx [Normal Appearance] : normal appearance [Normal Rate/Rhythm] : normal rate/rhythm [No Resp Distress] : no resp distress [Clear to Auscultation Bilaterally] : clear to auscultation bilaterally [No Clubbing] : no clubbing [No Edema] : no edema [No Focal Deficits] : no focal deficits [Oriented x3] : oriented x3

## 2023-08-16 NOTE — ASSESSMENT
[FreeTextEntry1] : 27-year-old woman with past medical history of moderate persistent asthma presents as follow-up.  She is 17 weeks pregnant with twins today.  However despite pregnancy her dyspnea is actually improved from prior.  Her asthma is well controlled.  She is only on albuterol as needed.  We had an extensive discussion regarding the anticipated changes to her breathing and asthma during pregnacy.   -Continue montelukast during pregnancy -Continue albuterol inhaler as needed -Spacer and peak flow meter provided -Will send nebulizer and albuterol nebs as needed -Symbicort helped her in the past will prescribe Symbicort 80/4.5 mics twice daily -Follow-up in 2 months or sooner if breathing worsens -Encouraged to have a low threshold to contact the clinic if she has any worsening in her asthma or dyspnea -PFTs reviewed normal lung values, FEV1/FVC ratio normal greater than 200 cc improvement post nebulizer and FEV1 lung volumes normal DLCO very mildly reduced at 75

## 2023-08-16 NOTE — HISTORY OF PRESENT ILLNESS
[TextBox_4] : 28 yo F pmh of moderate persistent Asthma presents for follow up. She is currently 17 weeks pregnant. Dyspnea is actually improved and she feels her asthma is well controlled.  She uses albuterol inhaler approximately once a week.  She has not had any recent ED or urgent care visits.  No recent use of prednisone.  She is using montelukast at night.  She did have Symbicort from her last visit but has not refilled it.  She states at the time she did feel it was helping her asthma.  No significant shortness of breath and her dyspnea is improved from prior.  No complaints of chest pain.   Smoking Status: never   eCigarettes: never   Marijuana use: never

## 2023-08-18 ENCOUNTER — APPOINTMENT (OUTPATIENT)
Dept: ANTEPARTUM | Facility: CLINIC | Age: 28
End: 2023-08-18
Payer: MEDICARE

## 2023-08-18 ENCOUNTER — ASOB RESULT (OUTPATIENT)
Age: 28
End: 2023-08-18

## 2023-08-18 PROCEDURE — 76815 OB US LIMITED FETUS(S): CPT | Mod: 59

## 2023-08-18 PROCEDURE — 76817 TRANSVAGINAL US OBSTETRIC: CPT

## 2023-08-18 PROCEDURE — 76820 UMBILICAL ARTERY ECHO: CPT | Mod: 59

## 2023-08-18 PROCEDURE — 76821 MIDDLE CEREBRAL ARTERY ECHO: CPT | Mod: 59

## 2023-09-06 ENCOUNTER — ASOB RESULT (OUTPATIENT)
Age: 28
End: 2023-09-06

## 2023-09-06 ENCOUNTER — APPOINTMENT (OUTPATIENT)
Dept: ANTEPARTUM | Facility: CLINIC | Age: 28
End: 2023-09-06
Payer: COMMERCIAL

## 2023-09-06 DIAGNOSIS — O35.9XX2 MATERNAL CARE FOR (SUSPECTED) FETAL ABNORMALITY AND DAMAGE, UNSPECIFIED, FETUS 2: ICD-10-CM

## 2023-09-06 PROCEDURE — 76821 MIDDLE CEREBRAL ARTERY ECHO: CPT | Mod: 59

## 2023-09-06 PROCEDURE — 76811 OB US DETAILED SNGL FETUS: CPT

## 2023-09-06 PROCEDURE — 76820 UMBILICAL ARTERY ECHO: CPT

## 2023-09-06 PROCEDURE — 76812 OB US DETAILED ADDL FETUS: CPT

## 2023-09-08 ENCOUNTER — APPOINTMENT (OUTPATIENT)
Dept: ANTEPARTUM | Facility: CLINIC | Age: 28
End: 2023-09-08

## 2023-09-13 ENCOUNTER — APPOINTMENT (OUTPATIENT)
Dept: PEDIATRIC CARDIOLOGY | Facility: CLINIC | Age: 28
End: 2023-09-13
Payer: MEDICARE

## 2023-09-13 PROCEDURE — 76821 MIDDLE CEREBRAL ARTERY ECHO: CPT

## 2023-09-13 PROCEDURE — 76825 ECHO EXAM OF FETAL HEART: CPT | Mod: 59

## 2023-09-13 PROCEDURE — 76827 ECHO EXAM OF FETAL HEART: CPT

## 2023-09-13 PROCEDURE — 93325 DOPPLER ECHO COLOR FLOW MAPG: CPT | Mod: 59

## 2023-09-13 PROCEDURE — 76825 ECHO EXAM OF FETAL HEART: CPT

## 2023-09-13 PROCEDURE — 76820 UMBILICAL ARTERY ECHO: CPT

## 2023-09-13 PROCEDURE — 99202 OFFICE O/P NEW SF 15 MIN: CPT | Mod: 25

## 2023-09-13 PROCEDURE — 76821 MIDDLE CEREBRAL ARTERY ECHO: CPT | Mod: 59

## 2023-09-13 PROCEDURE — 76820 UMBILICAL ARTERY ECHO: CPT | Mod: 59

## 2023-09-20 ENCOUNTER — APPOINTMENT (OUTPATIENT)
Dept: ANTEPARTUM | Facility: CLINIC | Age: 28
End: 2023-09-20
Payer: COMMERCIAL

## 2023-09-20 ENCOUNTER — ASOB RESULT (OUTPATIENT)
Age: 28
End: 2023-09-20

## 2023-09-20 PROCEDURE — 76821 MIDDLE CEREBRAL ARTERY ECHO: CPT | Mod: 59

## 2023-09-20 PROCEDURE — 76820 UMBILICAL ARTERY ECHO: CPT | Mod: 59

## 2023-09-20 PROCEDURE — 76816 OB US FOLLOW-UP PER FETUS: CPT

## 2023-10-05 ENCOUNTER — APPOINTMENT (OUTPATIENT)
Dept: ANTEPARTUM | Facility: CLINIC | Age: 28
End: 2023-10-05
Payer: COMMERCIAL

## 2023-10-05 ENCOUNTER — ASOB RESULT (OUTPATIENT)
Age: 28
End: 2023-10-05

## 2023-10-05 PROCEDURE — 76816 OB US FOLLOW-UP PER FETUS: CPT

## 2023-10-18 ENCOUNTER — APPOINTMENT (OUTPATIENT)
Dept: ANTEPARTUM | Facility: CLINIC | Age: 28
End: 2023-10-18
Payer: COMMERCIAL

## 2023-10-18 ENCOUNTER — ASOB RESULT (OUTPATIENT)
Age: 28
End: 2023-10-18

## 2023-10-18 PROCEDURE — 76815 OB US LIMITED FETUS(S): CPT

## 2023-10-24 ENCOUNTER — APPOINTMENT (OUTPATIENT)
Dept: ANTEPARTUM | Facility: CLINIC | Age: 28
End: 2023-10-24

## 2023-10-24 ENCOUNTER — OUTPATIENT (OUTPATIENT)
Dept: OUTPATIENT SERVICES | Facility: HOSPITAL | Age: 28
LOS: 1 days | Discharge: HOME | End: 2023-10-24
Payer: COMMERCIAL

## 2023-10-24 VITALS — SYSTOLIC BLOOD PRESSURE: 116 MMHG | TEMPERATURE: 99 F | DIASTOLIC BLOOD PRESSURE: 57 MMHG | HEART RATE: 85 BPM

## 2023-10-24 VITALS — DIASTOLIC BLOOD PRESSURE: 70 MMHG | TEMPERATURE: 99 F | HEART RATE: 84 BPM | SYSTOLIC BLOOD PRESSURE: 125 MMHG

## 2023-10-24 DIAGNOSIS — Z98.89 OTHER SPECIFIED POSTPROCEDURAL STATES: Chronic | ICD-10-CM

## 2023-10-24 DIAGNOSIS — O26.899 OTHER SPECIFIED PREGNANCY RELATED CONDITIONS, UNSPECIFIED TRIMESTER: ICD-10-CM

## 2023-10-24 LAB
APPEARANCE UR: CLEAR — SIGNIFICANT CHANGE UP
APPEARANCE UR: CLEAR — SIGNIFICANT CHANGE UP
APTT BLD: 25.3 SEC — SIGNIFICANT CHANGE UP (ref 24.5–35.6)
APTT BLD: 25.3 SEC — SIGNIFICANT CHANGE UP (ref 24.5–35.6)
BASOPHILS # BLD AUTO: 0.02 K/UL — SIGNIFICANT CHANGE UP (ref 0–0.2)
BASOPHILS # BLD AUTO: 0.02 K/UL — SIGNIFICANT CHANGE UP (ref 0–0.2)
BASOPHILS NFR BLD AUTO: 0.3 % — SIGNIFICANT CHANGE UP (ref 0–2)
BASOPHILS NFR BLD AUTO: 0.3 % — SIGNIFICANT CHANGE UP (ref 0–2)
BILIRUB UR-MCNC: NEGATIVE — SIGNIFICANT CHANGE UP
BILIRUB UR-MCNC: NEGATIVE — SIGNIFICANT CHANGE UP
BLD GP AB SCN SERPL QL: NEGATIVE — SIGNIFICANT CHANGE UP
BLD GP AB SCN SERPL QL: NEGATIVE — SIGNIFICANT CHANGE UP
COLOR SPEC: YELLOW — SIGNIFICANT CHANGE UP
COLOR SPEC: YELLOW — SIGNIFICANT CHANGE UP
DIFF PNL FLD: NEGATIVE — SIGNIFICANT CHANGE UP
DIFF PNL FLD: NEGATIVE — SIGNIFICANT CHANGE UP
EOSINOPHIL # BLD AUTO: 0.08 K/UL — SIGNIFICANT CHANGE UP (ref 0–0.5)
EOSINOPHIL # BLD AUTO: 0.08 K/UL — SIGNIFICANT CHANGE UP (ref 0–0.5)
EOSINOPHIL NFR BLD AUTO: 1.1 % — SIGNIFICANT CHANGE UP (ref 0–6)
EOSINOPHIL NFR BLD AUTO: 1.1 % — SIGNIFICANT CHANGE UP (ref 0–6)
FIBRINOGEN PPP-MCNC: 320 MG/DL — SIGNIFICANT CHANGE UP (ref 200–465)
FIBRINOGEN PPP-MCNC: 320 MG/DL — SIGNIFICANT CHANGE UP (ref 200–465)
GLUCOSE UR QL: NEGATIVE MG/DL — SIGNIFICANT CHANGE UP
GLUCOSE UR QL: NEGATIVE MG/DL — SIGNIFICANT CHANGE UP
HCT VFR BLD CALC: 37.3 % — SIGNIFICANT CHANGE UP (ref 34.5–45)
HCT VFR BLD CALC: 37.3 % — SIGNIFICANT CHANGE UP (ref 34.5–45)
HGB BLD-MCNC: 12.5 G/DL — SIGNIFICANT CHANGE UP (ref 11.5–15.5)
HGB BLD-MCNC: 12.5 G/DL — SIGNIFICANT CHANGE UP (ref 11.5–15.5)
IANC: 5.11 K/UL — SIGNIFICANT CHANGE UP (ref 1.8–7.4)
IANC: 5.11 K/UL — SIGNIFICANT CHANGE UP (ref 1.8–7.4)
IMM GRANULOCYTES NFR BLD AUTO: 0.8 % — SIGNIFICANT CHANGE UP (ref 0–0.9)
IMM GRANULOCYTES NFR BLD AUTO: 0.8 % — SIGNIFICANT CHANGE UP (ref 0–0.9)
INR BLD: 0.97 RATIO — SIGNIFICANT CHANGE UP (ref 0.85–1.18)
INR BLD: 0.97 RATIO — SIGNIFICANT CHANGE UP (ref 0.85–1.18)
KETONES UR-MCNC: NEGATIVE MG/DL — SIGNIFICANT CHANGE UP
KETONES UR-MCNC: NEGATIVE MG/DL — SIGNIFICANT CHANGE UP
LEUKOCYTE ESTERASE UR-ACNC: NEGATIVE — SIGNIFICANT CHANGE UP
LEUKOCYTE ESTERASE UR-ACNC: NEGATIVE — SIGNIFICANT CHANGE UP
LYMPHOCYTES # BLD AUTO: 1.6 K/UL — SIGNIFICANT CHANGE UP (ref 1–3.3)
LYMPHOCYTES # BLD AUTO: 1.6 K/UL — SIGNIFICANT CHANGE UP (ref 1–3.3)
LYMPHOCYTES # BLD AUTO: 21.4 % — SIGNIFICANT CHANGE UP (ref 13–44)
LYMPHOCYTES # BLD AUTO: 21.4 % — SIGNIFICANT CHANGE UP (ref 13–44)
MCHC RBC-ENTMCNC: 32.3 PG — SIGNIFICANT CHANGE UP (ref 27–34)
MCHC RBC-ENTMCNC: 32.3 PG — SIGNIFICANT CHANGE UP (ref 27–34)
MCHC RBC-ENTMCNC: 33.5 GM/DL — SIGNIFICANT CHANGE UP (ref 32–36)
MCHC RBC-ENTMCNC: 33.5 GM/DL — SIGNIFICANT CHANGE UP (ref 32–36)
MCV RBC AUTO: 96.4 FL — SIGNIFICANT CHANGE UP (ref 80–100)
MCV RBC AUTO: 96.4 FL — SIGNIFICANT CHANGE UP (ref 80–100)
MONOCYTES # BLD AUTO: 0.6 K/UL — SIGNIFICANT CHANGE UP (ref 0–0.9)
MONOCYTES # BLD AUTO: 0.6 K/UL — SIGNIFICANT CHANGE UP (ref 0–0.9)
MONOCYTES NFR BLD AUTO: 8 % — SIGNIFICANT CHANGE UP (ref 2–14)
MONOCYTES NFR BLD AUTO: 8 % — SIGNIFICANT CHANGE UP (ref 2–14)
NEUTROPHILS # BLD AUTO: 5.11 K/UL — SIGNIFICANT CHANGE UP (ref 1.8–7.4)
NEUTROPHILS # BLD AUTO: 5.11 K/UL — SIGNIFICANT CHANGE UP (ref 1.8–7.4)
NEUTROPHILS NFR BLD AUTO: 68.4 % — SIGNIFICANT CHANGE UP (ref 43–77)
NEUTROPHILS NFR BLD AUTO: 68.4 % — SIGNIFICANT CHANGE UP (ref 43–77)
NITRITE UR-MCNC: NEGATIVE — SIGNIFICANT CHANGE UP
NITRITE UR-MCNC: NEGATIVE — SIGNIFICANT CHANGE UP
NRBC # BLD: 0 /100 WBCS — SIGNIFICANT CHANGE UP (ref 0–0)
NRBC # BLD: 0 /100 WBCS — SIGNIFICANT CHANGE UP (ref 0–0)
NRBC # FLD: 0 K/UL — SIGNIFICANT CHANGE UP (ref 0–0)
NRBC # FLD: 0 K/UL — SIGNIFICANT CHANGE UP (ref 0–0)
PH UR: 6 — SIGNIFICANT CHANGE UP (ref 5–8)
PH UR: 6 — SIGNIFICANT CHANGE UP (ref 5–8)
PLATELET # BLD AUTO: 202 K/UL — SIGNIFICANT CHANGE UP (ref 150–400)
PLATELET # BLD AUTO: 202 K/UL — SIGNIFICANT CHANGE UP (ref 150–400)
PROT UR-MCNC: NEGATIVE MG/DL — SIGNIFICANT CHANGE UP
PROT UR-MCNC: NEGATIVE MG/DL — SIGNIFICANT CHANGE UP
PROTHROM AB SERPL-ACNC: 10.9 SEC — SIGNIFICANT CHANGE UP (ref 9.5–13)
PROTHROM AB SERPL-ACNC: 10.9 SEC — SIGNIFICANT CHANGE UP (ref 9.5–13)
RBC # BLD: 3.87 M/UL — SIGNIFICANT CHANGE UP (ref 3.8–5.2)
RBC # BLD: 3.87 M/UL — SIGNIFICANT CHANGE UP (ref 3.8–5.2)
RBC # FLD: 13.5 % — SIGNIFICANT CHANGE UP (ref 10.3–14.5)
RBC # FLD: 13.5 % — SIGNIFICANT CHANGE UP (ref 10.3–14.5)
RH IG SCN BLD-IMP: POSITIVE — SIGNIFICANT CHANGE UP
RH IG SCN BLD-IMP: POSITIVE — SIGNIFICANT CHANGE UP
SP GR SPEC: 1.01 — SIGNIFICANT CHANGE UP (ref 1–1.03)
SP GR SPEC: 1.01 — SIGNIFICANT CHANGE UP (ref 1–1.03)
UROBILINOGEN FLD QL: 0.2 MG/DL — SIGNIFICANT CHANGE UP (ref 0.2–1)
UROBILINOGEN FLD QL: 0.2 MG/DL — SIGNIFICANT CHANGE UP (ref 0.2–1)
WBC # BLD: 7.47 K/UL — SIGNIFICANT CHANGE UP (ref 3.8–10.5)
WBC # BLD: 7.47 K/UL — SIGNIFICANT CHANGE UP (ref 3.8–10.5)
WBC # FLD AUTO: 7.47 K/UL — SIGNIFICANT CHANGE UP (ref 3.8–10.5)
WBC # FLD AUTO: 7.47 K/UL — SIGNIFICANT CHANGE UP (ref 3.8–10.5)

## 2023-10-24 PROCEDURE — 99221 1ST HOSP IP/OBS SF/LOW 40: CPT | Mod: 25

## 2023-10-24 PROCEDURE — 59025 FETAL NON-STRESS TEST: CPT | Mod: 26,59

## 2023-10-24 NOTE — OB PROVIDER TRIAGE NOTE - ADDITIONAL INSTRUCTIONS
- Please follow up with Dr. Gonzalez at your next scheduled appointment.   - Please return for decreased/no fetal movement, vaginal bleeding similar to that of a period, leaking/gush of fluid, regular contractions occurring 4-5 minutes for one hour or requiring pain medication

## 2023-10-24 NOTE — OB PROVIDER TRIAGE NOTE - NSOBPROVIDERNOTE_OBGYN_ALL_OB_FT
27 year old female  @ 27.2GA with TIUP, mono-di twins, presents c/o decreased FM x 12 hours and abdominal trauma.  d/w Dr Gonzalez, pt requires 4 hour monitoring (not 24 as per Dr Gonzalez), abruptoins labs, SSE. As per Dr Gonzalez, TVS not to be performed.     A: NST reactive, limited sonogram reassuring  - pt verbalizes seeing and feeling GFM  B: NST reactive, limited sonogram reassuring - pt verbalizes seeing and feeling GFM    GFM present. Pt states she feels GFM. 27 year old female  @ 27.2GA with TIUP, mono-di twins, presents c/o decreased FM x 12 hours and s/p abdominal trauma.  d/w Dr Gonzalez, pt requires 4 hour monitoring (not 24 as per Dr Gonzalez), abruption labs, SSE. As per Dr Gonzalez, TVS not to be performed.     A: NST reactive, limited sonogram reassuring  - pt verbalizes seeing and feeling GFM  B: NST reactive, limited sonogram reassuring - pt verbalizes seeing and feeling GFM    GFM present. Pt states she feels GFM.  SSE - cervix appears closed, without evidence VB/LOF    awaiting lab results  signed out to day shift   NST until 9:45 am 27 year old female  @ 27.2GA with TIUP, mono-di twins, presents c/o decreased FM x 12 hours and s/p abdominal trauma.  d/w Dr Gonzalez, pt requires 4 hour monitoring (not 24 as per Dr Gonzalez), abruption labs, SSE. As per Dr Gonzalez, TVS not to be performed.     A: NST reactive, limited sonogram reassuring  - pt verbalizes seeing and feeling GFM  B: NST reactive, limited sonogram reassuring - pt verbalizes seeing and feeling GFM    GFM present. Pt states she feels GFM.  SSE - cervix appears closed, without evidence VB/LOF    awaiting lab results  signed out to day shift   NST until 9:45 am    -----------------------------------------------  Received sign out from night team  Dr. Gonzalez @ bedside. Reviewed and cleared tracing. Notes patient is cleared for discharge and has follow up with her in the office    Plan    - Patient to be discharged home with follow up and return precautions  - Please follow up with Dr. Gonzalez at your next scheduled appointment.   - Please return for decreased/no fetal movement, vaginal bleeding similar to that of a period, leaking/gush of fluid, regular contractions occurring 4-5 minutes for one hour or requiring pain medication   - Patient educated of plan and demonstrate understanding. All questions answered. Discharge instructions provided and signed.   - Discharged at: 10:15    Evaluation and plan d/w Dr. Gonzalez

## 2023-10-24 NOTE — OB PROVIDER TRIAGE NOTE - PRETERM DELIVERIES, OB PROFILE
0 Hydroquinone Counseling:  Patient advised that medication may result in skin irritation, lightening (hypopigmentation), dryness, and burning.  In the event of skin irritation, the patient was advised to reduce the amount of the drug applied or use it less frequently.  Rarely, spots that are treated with hydroquinone can become darker (pseudoochronosis).  Should this occur, patient instructed to stop medication and call the office. The patient verbalized understanding of the proper use and possible adverse effects of hydroquinone.  All of the patient's questions and concerns were addressed.

## 2023-10-24 NOTE — OB PROVIDER TRIAGE NOTE - NSHPPHYSICALEXAM_GEN_ALL_CORE
Vital Signs Last 24 Hrs  T(C): 37.1 (24 Oct 2023 05:28), Max: 37.1 (24 Oct 2023 05:28)  T(F): 98.8 (24 Oct 2023 05:28), Max: 98.8 (24 Oct 2023 05:28)  HR: 84 (24 Oct 2023 05:28) (84 - 84)  BP: 125/70 (24 Oct 2023 05:28) (125/70 - 125/70)  BP(mean): --  RR: 18 (24 Oct 2023 05:28) (18 - 18)  SpO2: --    gen: a/o x 3, NAD  pulm: breathing unlabored on room air  abd: soft and nontender, gravid  TAS: A breech by sono. posteroir placenta. m wave 154 bpm. GFM. MVP 3.76. image saved in ASOB         B transverse lie, head to maternal right. poserior placenta. m wave 158 bpm. GFM. MVP 4.54. image saved in ASOB.  EFM: A baseline 145 with mod variability, pos accels- reactive NST          B baseline 145 with mod variability, pos accels- reactive NST   TOCO: non-yasmine, mild irritability. pt denies feeling contractions/pain Vital Signs Last 24 Hrs  T(C): 37.1 (24 Oct 2023 05:28), Max: 37.1 (24 Oct 2023 05:28)  T(F): 98.8 (24 Oct 2023 05:28), Max: 98.8 (24 Oct 2023 05:28)  HR: 84 (24 Oct 2023 05:28) (84 - 84)  BP: 125/70 (24 Oct 2023 05:28) (125/70 - 125/70)  BP(mean): --  RR: 18 (24 Oct 2023 05:28) (18 - 18)  SpO2: --    gen: a/o x 3, NAD  pulm: breathing unlabored on room air  abd: soft and nontender, gravid  TAS: A breech by sono. posteroir placenta. m wave 154 bpm. GFM. MVP 3.76. image saved in ASOB         B transverse lie, head to maternal right. poserior placenta. m wave 158 bpm. GFM. MVP 4.54. image saved in ASOB.  SSE: cervix appears closed. no VB/LOF/pooling noted.   EFM: A baseline 145 with mod variability, pos accels- reactive NST          B baseline 145 with mod variability, pos accels- reactive NST   TOCO: non-yasmine, mild irritability. pt denies feeling contractions/pain

## 2023-10-24 NOTE — OB PROVIDER TRIAGE NOTE - HISTORY OF PRESENT ILLNESS
27 year old female  @ 27.2GA with TIUP, mono-di twins, presents c/o decreased FM x 12 hours. upon evaluation in triage, pt reports she feels GFM. Pt adds that at 10am on 10/23/23, a basketball lightly hit her belly after first hitting the ground. she was evaluated by her OB provider after and was "cleared." a few hours later she started feeling less FM. denies contractions, pain, LOF, VB, HA, N/V/D, fever, chills.   PNC with Dr Gonzalez, last visit 10/23/24. next 23  MFM every 2 weeks, next visit 23 27 year old female  @ 27.2GA with TIUP, mono-di twins, presents c/o decreased FM x 12 hours, resolved. upon evaluation in triage, pt reports she feels GFM. Pt adds that at 10am on 10/23/23, a basketball lightly hit her belly after first hitting the ground. she was evaluated by her OB provider after and was "cleared." a few hours later she started feeling less FM. denies contractions, pain, LOF, VB, HA, N/V/D, fever, chills.   PNC with Dr Gonzalez, last visit 10/23/24. next 23  MFM every 2 weeks, next visit 23

## 2023-10-24 NOTE — OB RN TRIAGE NOTE - TEMPERATURE IN CELSIUS (DEGREES C)
Please inform patient chest xray was negative, I have ordered the d-dimer but do not have results back
Refilled inhaler and ordered a medrol dose pack sent to [facility], if her breathing problem is causing respiratory distress she should see an ER
37.1

## 2023-10-24 NOTE — OB RN TRIAGE NOTE - HOW PATIENT ADDRESSED, PROFILE
Physical Therapy Progress Note    Referred by: Nancy Armstrong PA-C; Medical Diagnosis (from order):    Diagnosis Information      Diagnosis    715.15 (ICD-9-CM) - M16.11 (ICD-10-CM) - Primary osteoarthritis of right hip              Visit: 15    Visit Type: Progress Note    SUBJECTIVE                                                                                                               Patient remarks there is a noticeable improvement with right hip. He is now walking without the cane in the home. He is consistently doing reciprocal stairs with use of rail both for ascent and descent. He has to consciously correct his walk and then can walk \"almost without a limp.\" He was able to walk a mile in 21 minutes. He did try to push a faster pace next time but had to stop early due to soreness. He walked on inclines in grass to pick apples for 45 minutes, was sore afterwards.   Functional Change: Able to reciprocally negotiate stairs with use of rail  Improved speed with walking 1 mile distance  Decreased upper extremity reliance on assistive device with ambulation; able to walk shorter distances without assistive device.   Current Functional Limitations: Requires assistive device or heavier reliance on rail as he fatigues  Continues to wake from sleep after about 2 hours on average  Sit to stand: has to stand and stretch before walking  Unable to bend down to floor to pick something up due to pain in hip    OBJECTIVE                                                                                                                         Outcome Measures:   JR TATIANA Raw Score: 10  TATIANA [place] Calculated Score: 58.93  (interval score: 0=total hip disability to 100=perfect hip health) see flowsheet for additional documentation   TREATMENT                                                                                                                  Therapeutic Exercise:  Performed the following exercises:   NuStep warm up  seat position 12, arms 9 x 8 minutes:  3 minutes attended    Ambulation 30 feet x 2 in clinic without assistive device  Partial lunge bilaterally 10 repetitions (upright lunge leading with right leg, forward lunge leading with left leg). Verbal and visual instruction to work on decreasing anterior knee translation to optimize knee comfort. Advised patient to continue with exercise if he does not feel more pain in knees following this exercise. Gradually work on lower descent of posterior knee towards floor.    Sit to stand with left foot back-trial from higher chair: able x 2 repetitions (unable last week)       Discussion of patient's current Home Exercise Program and progression as he approaches discharge. Patient sent file attachment of tentative exercise plan for post-PT.         Neuromuscular Re-Education:  Ergonomic education/instruction for computer set up: suggested raising monitor of laptop with use of detachable keyboard to prevent flexion of spine.   Re-instruction in sitting posture. Advised monitoring and self-correcting if sitting with legs abducted or externally rotated.   High marching forward through ladder x 2 passes  Lateral walk through ladder with high knees x 1 pass each direction  Backwards walking 20 feet  Worked briefly on trunk rotation with arm swing (allow right shoulder to come forward with right arm swing)  Body mechanics for picking up light item from floor-okay to kick right leg back. Gradually work on right leg closer to left as able.     Skilled input: verbal instruction/cues and tactile instruction/cues    Writer verbally educated and received verbal consent for hand placement, positioning of patient, and techniques to be performed today from patient for therapist position for techniques and hand placement and palpation for techniques as described above and how they are pertinent to the patient's plan of care.    Home Exercise Program/Education Materials: Access Code:  [email or web address]  URL: [email or web address]  Date: 09/27/2022  Prepared by: Jennifer Valladares    Exercises  · Supine Heel Slide - 2 x daily - 3 sets - 10 reps - 5 hold  · Small Range Straight Leg Raise - 2 x daily - 10 reps  · Single Leg Stance - 2 x daily - 1 reps - 30-60 hold  · Standing Hip Abduction with Resistance at Ankles and Counter Support - 2 x daily - 10-15 reps  · Standing Hip Extension with Resistance at Ankles and Counter Support - 2 x daily - 10-15 reps  · Step Up (Mirrored) - 2 x daily - 10 reps  · Forward Step Down - 2 x daily - 10 reps  · Seated Piriformis Stretch - 1 x daily - 5-10 reps - 1 hold  · Lunge with Counter Support - 1 x daily - 10 reps  · Forward Fall Out Lunge - 1 x daily - 10 reps  · Standing Hip Flexor Stretch - 2 x daily - 3 reps - 30 hold  · Sit to Stand Without Arm Support - 2 x daily - 10 reps              ASSESSMENT                                                                                                             Patient overall has made great progress in recent weeks with decreased dependence on assistive device, improved function of stair negotiation now reciprocally, improved ability to rely on right leg for sit to stand transfers. He had a set back earlier in therapy with strain of right hip flexor which limited temporary participation in some exercises though is gradually able to lift leg with hip flexor with less discomfort (still feels it). Patient is motivated in putting together a long-term exercise program as he is approaching reaching goals with PT.   To date the patient has made gains as expected as reported. Patient continues to have impairments and functional deficits as noted.  Patient will continue to benefit from skilled care as outlined.  Patient Education:   Results of above outlined education: Verbalizes understanding and Needs reinforcement      PLAN                                                                                                                            Updates to plan of care: extend current plan of care    Frequency / Duration: 1 times per week tapering as patient progresses  for an estimated additional 3 visits for additional 3 weeks    Suggestions for next session as indicated: Progress per plan of care working towards instruction in progressive Home Exercise Program and long-term management      GOALS                                                                                                                           Long Term Goals: to be met by end of plan of care  1. Patient will demonstrate ability to negotiate level and unlevel surfaces at variable velocities, including change of direction without increased pain or instability to return to age appropriate and community activities without use of assistive device in 10 weeks. Status: Able with increased pain, no instability.   2. Patient will ascend and descend 1 flight of steps, with one hand rail, using reciprocal pattern, with minimal pain and without difficulty for completion of household tasks such as getting upstairs to bedroom independently without restriction in 10 weeks. Status: met   3. Patient will complete independent lower body dressing without reported pain and with reported manageable/tolerable difficulty in 10 weeks. Status: progressing/ongoing Improving, needs help with sock donning  4. Patient will be independent with progressed and modified home exercise program.  Status: progressing/ongoing      Therapy procedure time and total treatment time can be found documented on the Time Entry flowsheet   Eun

## 2023-10-24 NOTE — OB PROVIDER TRIAGE NOTE - NSHPLABSRESULTS_GEN_ALL_CORE
12.5   7.47  )-----------( 202      ( 24 Oct 2023 06:15 )             37.3    Urinalysis Basic - ( 24 Oct 2023 07:42 )    Color: Yellow / Appearance: Clear / S.011 / pH: x  Gluc: x / Ketone: Negative mg/dL  / Bili: Negative / Urobili: 0.2 mg/dL   Blood: x / Protein: Negative mg/dL / Nitrite: Negative   Leuk Esterase: Negative / RBC: x / WBC x   Sq Epi: x / Non Sq Epi: x / Bacteria: x

## 2023-10-26 DIAGNOSIS — O30.032 TWIN PREGNANCY, MONOCHORIONIC/DIAMNIOTIC, SECOND TRIMESTER: ICD-10-CM

## 2023-10-26 DIAGNOSIS — Z3A.27 27 WEEKS GESTATION OF PREGNANCY: ICD-10-CM

## 2023-10-26 DIAGNOSIS — O36.8120 DECREASED FETAL MOVEMENTS, SECOND TRIMESTER, NOT APPLICABLE OR UNSPECIFIED: ICD-10-CM

## 2023-10-26 DIAGNOSIS — O99.512 DISEASES OF THE RESPIRATORY SYSTEM COMPLICATING PREGNANCY, SECOND TRIMESTER: ICD-10-CM

## 2023-10-26 DIAGNOSIS — J45.909 UNSPECIFIED ASTHMA, UNCOMPLICATED: ICD-10-CM

## 2023-11-02 ENCOUNTER — APPOINTMENT (OUTPATIENT)
Dept: ANTEPARTUM | Facility: CLINIC | Age: 28
End: 2023-11-02
Payer: COMMERCIAL

## 2023-11-02 ENCOUNTER — APPOINTMENT (OUTPATIENT)
Dept: PULMONOLOGY | Facility: CLINIC | Age: 28
End: 2023-11-02
Payer: COMMERCIAL

## 2023-11-02 ENCOUNTER — ASOB RESULT (OUTPATIENT)
Age: 28
End: 2023-11-02

## 2023-11-02 VITALS
WEIGHT: 194.38 LBS | HEIGHT: 64 IN | DIASTOLIC BLOOD PRESSURE: 74 MMHG | OXYGEN SATURATION: 98 % | HEART RATE: 90 BPM | RESPIRATION RATE: 16 BRPM | SYSTOLIC BLOOD PRESSURE: 115 MMHG | BODY MASS INDEX: 33.19 KG/M2 | TEMPERATURE: 98.2 F

## 2023-11-02 PROCEDURE — 76819 FETAL BIOPHYS PROFIL W/O NST: CPT | Mod: 59

## 2023-11-02 PROCEDURE — 99213 OFFICE O/P EST LOW 20 MIN: CPT

## 2023-11-02 PROCEDURE — 76816 OB US FOLLOW-UP PER FETUS: CPT | Mod: 59

## 2023-11-02 RX ORDER — BUDESONIDE AND FORMOTEROL FUMARATE DIHYDRATE 80; 4.5 UG/1; UG/1
80-4.5 AEROSOL RESPIRATORY (INHALATION) TWICE DAILY
Qty: 1 | Refills: 5 | Status: ACTIVE | COMMUNITY
Start: 2023-11-02 | End: 1900-01-01

## 2023-11-02 RX ORDER — ALBUTEROL SULFATE 2.5 MG/3ML
(2.5 MG/3ML) SOLUTION RESPIRATORY (INHALATION)
Qty: 1 | Refills: 5 | Status: ACTIVE | COMMUNITY
Start: 2023-11-02 | End: 1900-01-01

## 2023-11-02 RX ORDER — ALBUTEROL SULFATE 90 UG/1
108 (90 BASE) INHALANT RESPIRATORY (INHALATION) EVERY 4 HOURS
Qty: 1 | Refills: 5 | Status: ACTIVE | COMMUNITY
Start: 2023-11-02 | End: 1900-01-01

## 2023-11-02 RX ORDER — FLUTICASONE PROPIONATE 50 UG/1
50 SPRAY, METERED NASAL DAILY
Qty: 1 | Refills: 3 | Status: ACTIVE | COMMUNITY
Start: 2023-11-02 | End: 1900-01-01

## 2023-11-16 ENCOUNTER — APPOINTMENT (OUTPATIENT)
Dept: ANTEPARTUM | Facility: CLINIC | Age: 28
End: 2023-11-16
Payer: COMMERCIAL

## 2023-11-16 ENCOUNTER — ASOB RESULT (OUTPATIENT)
Age: 28
End: 2023-11-16

## 2023-11-16 PROCEDURE — 76819 FETAL BIOPHYS PROFIL W/O NST: CPT | Mod: 59

## 2023-11-16 PROCEDURE — 76816 OB US FOLLOW-UP PER FETUS: CPT

## 2023-11-20 NOTE — ED PROVIDER NOTE - NSTIMEPROVIDERCAREINITIATE_GEN_ER
Rossy arrives to hospitals for q 4 week Nucala for severe persistent asthma. Patient denies active infections, denies recent asthma exacerbations. Nucala administered SQ to back of left arm without adverse s/s. Confirmed next appt on 12/10 with patient. Discharged home to self care in no apparent distress.   
ENT
16-Jun-2019 02:28

## 2023-11-27 ENCOUNTER — APPOINTMENT (OUTPATIENT)
Dept: ANTEPARTUM | Facility: CLINIC | Age: 28
End: 2023-11-27

## 2023-11-27 ENCOUNTER — OUTPATIENT (OUTPATIENT)
Dept: INPATIENT UNIT | Facility: HOSPITAL | Age: 28
LOS: 1 days | Discharge: HOME | End: 2023-11-27
Payer: COMMERCIAL

## 2023-11-27 VITALS
SYSTOLIC BLOOD PRESSURE: 131 MMHG | HEART RATE: 95 BPM | RESPIRATION RATE: 14 BRPM | DIASTOLIC BLOOD PRESSURE: 74 MMHG | TEMPERATURE: 98 F

## 2023-11-27 VITALS — OXYGEN SATURATION: 98 % | HEART RATE: 91 BPM

## 2023-11-27 DIAGNOSIS — O26.899 OTHER SPECIFIED PREGNANCY RELATED CONDITIONS, UNSPECIFIED TRIMESTER: ICD-10-CM

## 2023-11-27 DIAGNOSIS — Z98.89 OTHER SPECIFIED POSTPROCEDURAL STATES: Chronic | ICD-10-CM

## 2023-11-27 LAB
APPEARANCE UR: CLEAR — SIGNIFICANT CHANGE UP
APPEARANCE UR: CLEAR — SIGNIFICANT CHANGE UP
BACTERIA # UR AUTO: NEGATIVE /HPF — SIGNIFICANT CHANGE UP
BACTERIA # UR AUTO: NEGATIVE /HPF — SIGNIFICANT CHANGE UP
BILIRUB UR-MCNC: NEGATIVE — SIGNIFICANT CHANGE UP
BILIRUB UR-MCNC: NEGATIVE — SIGNIFICANT CHANGE UP
CAST: 0 /LPF — SIGNIFICANT CHANGE UP (ref 0–4)
CAST: 0 /LPF — SIGNIFICANT CHANGE UP (ref 0–4)
COLOR SPEC: YELLOW — SIGNIFICANT CHANGE UP
COLOR SPEC: YELLOW — SIGNIFICANT CHANGE UP
DIFF PNL FLD: ABNORMAL
DIFF PNL FLD: ABNORMAL
GLUCOSE UR QL: NEGATIVE MG/DL — SIGNIFICANT CHANGE UP
GLUCOSE UR QL: NEGATIVE MG/DL — SIGNIFICANT CHANGE UP
KETONES UR-MCNC: NEGATIVE MG/DL — SIGNIFICANT CHANGE UP
KETONES UR-MCNC: NEGATIVE MG/DL — SIGNIFICANT CHANGE UP
LEUKOCYTE ESTERASE UR-ACNC: NEGATIVE — SIGNIFICANT CHANGE UP
LEUKOCYTE ESTERASE UR-ACNC: NEGATIVE — SIGNIFICANT CHANGE UP
NITRITE UR-MCNC: NEGATIVE — SIGNIFICANT CHANGE UP
NITRITE UR-MCNC: NEGATIVE — SIGNIFICANT CHANGE UP
PH UR: 6 — SIGNIFICANT CHANGE UP (ref 5–8)
PH UR: 6 — SIGNIFICANT CHANGE UP (ref 5–8)
PROT UR-MCNC: NEGATIVE MG/DL — SIGNIFICANT CHANGE UP
PROT UR-MCNC: NEGATIVE MG/DL — SIGNIFICANT CHANGE UP
RBC CASTS # UR COMP ASSIST: 1 /HPF — SIGNIFICANT CHANGE UP (ref 0–4)
RBC CASTS # UR COMP ASSIST: 1 /HPF — SIGNIFICANT CHANGE UP (ref 0–4)
SP GR SPEC: 1.01 — SIGNIFICANT CHANGE UP (ref 1–1.03)
SP GR SPEC: 1.01 — SIGNIFICANT CHANGE UP (ref 1–1.03)
SQUAMOUS # UR AUTO: 2 /HPF — SIGNIFICANT CHANGE UP (ref 0–5)
SQUAMOUS # UR AUTO: 2 /HPF — SIGNIFICANT CHANGE UP (ref 0–5)
UROBILINOGEN FLD QL: 0.2 MG/DL — SIGNIFICANT CHANGE UP (ref 0.2–1)
UROBILINOGEN FLD QL: 0.2 MG/DL — SIGNIFICANT CHANGE UP (ref 0.2–1)
WBC UR QL: 2 /HPF — SIGNIFICANT CHANGE UP (ref 0–5)
WBC UR QL: 2 /HPF — SIGNIFICANT CHANGE UP (ref 0–5)

## 2023-11-27 PROCEDURE — 99221 1ST HOSP IP/OBS SF/LOW 40: CPT

## 2023-11-27 NOTE — OB PROVIDER TRIAGE NOTE - NSOBPROVIDERNOTE_OBGYN_ALL_OB_FT
26 y/o  @ 32.1 wks gestation with mono- di TIUP r/o PTL:  po hydration   urinalysis  NST in progress  will continue to monitor 26 y/o  @ 32.1 wks gestation with mono- di TIUP r/o PTL:  po hydration   urinalysis  NST in progress  will continue to monitor    addendum @ 1840:  pt comfortable   no evidence of PTL   likely with normal discomforts of pregnancy   maternal and fetal status reassuring   plan of care d/w dr tejeda  discharge home  PTL precautions d/w pt  increase fluid intake  instructed on fetal kickcounts   follow up with SANNA as sched on 2023  follow up with dr Tejeda as sched 2023  w/v discharge instructions given  discharged home      discharged @ 1842

## 2023-11-27 NOTE — OB PROVIDER TRIAGE NOTE - NS_ABDFINDING_OBGYN_ALL_OB
Electrodesiccation Text: The wound bed was treated with electrodesiccation after the biopsy was performed. Soft, nontender

## 2023-11-27 NOTE — OB PROVIDER TRIAGE NOTE - NSHPPHYSICALEXAM_GEN_ALL_CORE
abdomen: soft, nt on palp  T(C): 36.8 (11-27-23 @ 16:11), Max: 36.8 (11-27-23 @ 16:11)  HR: 100 (11-27-23 @ 16:20) (95 - 100)  BP: 131/68 (11-27-23 @ 16:20) (131/68 - 131/74)  RR: 14 (11-27-23 @ 16:11) (14 - 14)  SpO2: --  SVE: closed/70/-3    "A" breech FH- 134 bpm  "B" transverse FH- 145 bpm  NST in progress abdomen: soft, nt on palp  T(C): 36.8 (11-27-23 @ 16:11), Max: 36.8 (11-27-23 @ 16:11)  HR: 100 (11-27-23 @ 16:20) (95 - 100)  BP: 131/68 (11-27-23 @ 16:20) (131/68 - 131/74)  RR: 14 (11-27-23 @ 16:11) (14 - 14)  SpO2: --  SVE: closed/70/-3    "A" breech FH- 134 bpm  "B" transverse FH- 145 bpm  NST in progress    sono done by ROMERO Burgos NP   sono images saved in ASOB  OB limited sono   "A" breech MVP: 2.61 +FH - 134 bpm  "B" transverse MVP: 2.01 +FH - 145 bpm  TVS: 3.17-3.25 abdomen: soft, nt on palp  T(C): 36.8 (11-27-23 @ 16:11), Max: 36.8 (11-27-23 @ 16:11)  HR: 100 (11-27-23 @ 16:20) (95 - 100)  BP: 131/68 (11-27-23 @ 16:20) (131/68 - 131/74)  RR: 14 (11-27-23 @ 16:11) (14 - 14)  SpO2: --  SVE: closed/70/-3    "A" breech FH- 134 bpm  "B" transverse FH- 145 bpm  NST in progress    sono done by ROMERO Burgos NP   sono images saved in ASOB  OB limited sono   "A" breech MVP: 2.61 +FH - 134 bpm  "B" transverse MVP: 2.01 +FH - 145 bpm  TVS: 3.17-3.25      addendum @ 1840:  "A" FH baseline 135 FH variability mod +FH accels no FH decels   "B" FH baseline 140 FH variability mod +FH accels no FH decels   toco: irritability

## 2023-11-27 NOTE — OB PROVIDER TRIAGE NOTE - NSLOWDOSEASPIRINFREQUENCY_OBGYN_ALL_OB
Pain to coccyx area s/p trip and fall x 1 week ago. Has fracture right wrist had surgery x 2 weeks ago.
Frequently used (Daily)

## 2023-11-27 NOTE — OB PROVIDER TRIAGE NOTE - NSHPLABSRESULTS_GEN_ALL_CORE
urinalysis urinalysis  Urinalysis Basic - ( 2023 16:30 )    Color: Yellow / Appearance: Clear / S.007 / pH: x  Gluc: x / Ketone: Negative mg/dL  / Bili: Negative / Urobili: 0.2 mg/dL   Blood: x / Protein: Negative mg/dL / Nitrite: Negative   Leuk Esterase: Negative / RBC: 1 /HPF / WBC 2 /HPF   Sq Epi: x / Non Sq Epi: 2 /HPF / Bacteria: Negative /HPF

## 2023-11-27 NOTE — OB PROVIDER TRIAGE NOTE - NS_FHOBSERVED_OBGYN_ALL_OB
29 yr old female with hx of dental caries, active smoker presents to ed c/o right tooth #30 pain and loss of filling x 2 wks ago now having gradually worsening pain.  pt has been using orajel and this morning pain severe went to urgent care and had lidocaine injected to area and given amoxicillin and steroids. no relieve.  comes to ed for a dental eval. Yes

## 2023-11-27 NOTE — OB PROVIDER TRIAGE NOTE - ADDITIONAL INSTRUCTIONS
pt comfortable   no evidence of PTL   likely with normal discomforts of pregnancy   maternal and fetal status reassuring   plan of care d/w dr tejeda  discharge home  PTL precautions d/w pt  increase fluid intake  instructed on fetal kickcounts   follow up with MFM as sched on 12/1/2023  follow up with dr Tejeda as sched 12/11/2023  w/v discharge instructions given  discharged home      discharged @ 1846

## 2023-11-27 NOTE — OB PROVIDER TRIAGE NOTE - HISTORY OF PRESENT ILLNESS
26 y/o  @ 32.1 wks gestation mono- di , naturally conceived, twins pt presents with c/o abdominal cramping 2 per hour since 0930 reports her pain 2-3/10 on pain scale denies any lof or vb reports +FM x's 2 denies any n/v/d denies any fever or chills ap care comp by :  twin gestation - mono- di

## 2023-11-29 DIAGNOSIS — J45.909 UNSPECIFIED ASTHMA, UNCOMPLICATED: ICD-10-CM

## 2023-11-29 DIAGNOSIS — O30.033 TWIN PREGNANCY, MONOCHORIONIC/DIAMNIOTIC, THIRD TRIMESTER: ICD-10-CM

## 2023-11-29 DIAGNOSIS — O99.513 DISEASES OF THE RESPIRATORY SYSTEM COMPLICATING PREGNANCY, THIRD TRIMESTER: ICD-10-CM

## 2023-11-29 DIAGNOSIS — O47.03 FALSE LABOR BEFORE 37 COMPLETED WEEKS OF GESTATION, THIRD TRIMESTER: ICD-10-CM

## 2023-11-29 DIAGNOSIS — Z3A.32 32 WEEKS GESTATION OF PREGNANCY: ICD-10-CM

## 2023-12-01 ENCOUNTER — ASOB RESULT (OUTPATIENT)
Age: 28
End: 2023-12-01

## 2023-12-01 ENCOUNTER — APPOINTMENT (OUTPATIENT)
Dept: ANTEPARTUM | Facility: CLINIC | Age: 28
End: 2023-12-01
Payer: COMMERCIAL

## 2023-12-01 PROCEDURE — 76818 FETAL BIOPHYS PROFILE W/NST: CPT | Mod: 59

## 2023-12-01 PROCEDURE — 76820 UMBILICAL ARTERY ECHO: CPT | Mod: 59

## 2023-12-01 PROCEDURE — 76818 FETAL BIOPHYS PROFILE W/NST: CPT

## 2023-12-05 ENCOUNTER — APPOINTMENT (OUTPATIENT)
Dept: ANTEPARTUM | Facility: CLINIC | Age: 28
End: 2023-12-05
Payer: COMMERCIAL

## 2023-12-05 ENCOUNTER — ASOB RESULT (OUTPATIENT)
Age: 28
End: 2023-12-05

## 2023-12-05 PROCEDURE — 76816 OB US FOLLOW-UP PER FETUS: CPT

## 2023-12-05 PROCEDURE — 76819 FETAL BIOPHYS PROFIL W/O NST: CPT

## 2023-12-05 PROCEDURE — 76821 MIDDLE CEREBRAL ARTERY ECHO: CPT

## 2023-12-08 ENCOUNTER — ASOB RESULT (OUTPATIENT)
Age: 28
End: 2023-12-08

## 2023-12-08 ENCOUNTER — APPOINTMENT (OUTPATIENT)
Dept: ANTEPARTUM | Facility: CLINIC | Age: 28
End: 2023-12-08
Payer: COMMERCIAL

## 2023-12-08 PROCEDURE — 76820 UMBILICAL ARTERY ECHO: CPT

## 2023-12-08 PROCEDURE — 76818 FETAL BIOPHYS PROFILE W/NST: CPT

## 2023-12-08 PROCEDURE — 76821 MIDDLE CEREBRAL ARTERY ECHO: CPT

## 2023-12-08 PROCEDURE — 76818 FETAL BIOPHYS PROFILE W/NST: CPT | Mod: 59

## 2023-12-12 ENCOUNTER — APPOINTMENT (OUTPATIENT)
Dept: ANTEPARTUM | Facility: CLINIC | Age: 28
End: 2023-12-12
Payer: COMMERCIAL

## 2023-12-12 ENCOUNTER — ASOB RESULT (OUTPATIENT)
Age: 28
End: 2023-12-12

## 2023-12-12 PROCEDURE — 76821 MIDDLE CEREBRAL ARTERY ECHO: CPT

## 2023-12-12 PROCEDURE — 76821 MIDDLE CEREBRAL ARTERY ECHO: CPT | Mod: 59

## 2023-12-12 PROCEDURE — 76818 FETAL BIOPHYS PROFILE W/NST: CPT

## 2023-12-13 ENCOUNTER — APPOINTMENT (OUTPATIENT)
Dept: PULMONOLOGY | Facility: CLINIC | Age: 28
End: 2023-12-13
Payer: COMMERCIAL

## 2023-12-13 VITALS
TEMPERATURE: 98.7 F | SYSTOLIC BLOOD PRESSURE: 135 MMHG | BODY MASS INDEX: 36.75 KG/M2 | DIASTOLIC BLOOD PRESSURE: 84 MMHG | WEIGHT: 215.25 LBS | RESPIRATION RATE: 16 BRPM | OXYGEN SATURATION: 98 % | HEIGHT: 64 IN | HEART RATE: 97 BPM

## 2023-12-13 DIAGNOSIS — O99.513 DISEASES OF THE RESPIRATORY SYSTEM COMPLICATING PREGNANCY, THIRD TRIMESTER: ICD-10-CM

## 2023-12-13 DIAGNOSIS — R09.82 POSTNASAL DRIP: ICD-10-CM

## 2023-12-13 DIAGNOSIS — R06.02 SHORTNESS OF BREATH: ICD-10-CM

## 2023-12-13 DIAGNOSIS — J45.909 DISEASES OF THE RESPIRATORY SYSTEM COMPLICATING PREGNANCY, THIRD TRIMESTER: ICD-10-CM

## 2023-12-13 PROCEDURE — 99214 OFFICE O/P EST MOD 30 MIN: CPT

## 2023-12-14 PROBLEM — R06.02 SOB (SHORTNESS OF BREATH) ON EXERTION: Status: ACTIVE | Noted: 2023-05-04

## 2023-12-14 PROBLEM — R09.82 POST-NASAL DRIP: Status: ACTIVE | Noted: 2020-10-29

## 2023-12-14 PROBLEM — O99.513 ASTHMA AFFECTING PREGNANCY IN THIRD TRIMESTER: Status: ACTIVE | Noted: 2023-11-02

## 2023-12-14 NOTE — ASSESSMENT
[FreeTextEntry1] : 27-year-old woman who is 28 weeks pregnant with twins with a history of moderate persistent asthma presents for follow-up.  Her asthma remains well controlled though has been more dyspneic lately.  - incrase symbicort to 1-2 puffs bid standing - Albuterol as needed If she experiences severe dyspnea she will present to the emergency room and contact us if she feels her symptoms are generally worsening - can use flonase 2 puffs daily      I spent 30 minutes during this encounter. Total time excludes separate billing services.

## 2023-12-14 NOTE — HISTORY OF PRESENT ILLNESS
[TextBox_4] : 28 yo F pmh of moderate persistent Asthma presents for follow up. She is currently 34 weeks pregnant with twins, scheduled for delivery next week.   She feels her asthma is well controlled.   She has not had any recent ED or urgent care visits. No recent use of prednisone.  She states she does get dyspneic on exertion but she relates this to pregnancy overall. Lately she has used symbicort prn about twice a week. She is more dyspneic than prior but states she is now unclear if it is asthma or due to her pregnancy. States symbicort does help when she uses it.

## 2023-12-14 NOTE — PHYSICAL EXAM
[No Acute Distress] : no acute distress [Nasal congestion] : nasal congestion [Normal Appearance] : normal appearance [Normal Rate/Rhythm] : normal rate/rhythm [Normal S1, S2] : normal s1, s2 [No Resp Distress] : no resp distress [Clear to Auscultation Bilaterally] : clear to auscultation bilaterally [Normal Gait] : normal gait [No Clubbing] : no clubbing [No Focal Deficits] : no focal deficits [Oriented x3] : oriented x3 [Normal Affect] : normal affect [TextBox_89] : gravid abdomen

## 2023-12-15 ENCOUNTER — ASOB RESULT (OUTPATIENT)
Age: 28
End: 2023-12-15

## 2023-12-15 ENCOUNTER — OUTPATIENT (OUTPATIENT)
Dept: INPATIENT UNIT | Facility: HOSPITAL | Age: 28
LOS: 1 days | Discharge: HOME | End: 2023-12-15
Payer: COMMERCIAL

## 2023-12-15 ENCOUNTER — APPOINTMENT (OUTPATIENT)
Dept: ANTEPARTUM | Facility: CLINIC | Age: 28
End: 2023-12-15
Payer: COMMERCIAL

## 2023-12-15 VITALS
DIASTOLIC BLOOD PRESSURE: 64 MMHG | SYSTOLIC BLOOD PRESSURE: 143 MMHG | HEART RATE: 82 BPM | RESPIRATION RATE: 18 BRPM | TEMPERATURE: 99 F

## 2023-12-15 DIAGNOSIS — O26.899 OTHER SPECIFIED PREGNANCY RELATED CONDITIONS, UNSPECIFIED TRIMESTER: ICD-10-CM

## 2023-12-15 DIAGNOSIS — Z98.89 OTHER SPECIFIED POSTPROCEDURAL STATES: Chronic | ICD-10-CM

## 2023-12-15 LAB
ALBUMIN SERPL ELPH-MCNC: 3.4 G/DL — SIGNIFICANT CHANGE UP (ref 3.3–5)
ALBUMIN SERPL ELPH-MCNC: 3.4 G/DL — SIGNIFICANT CHANGE UP (ref 3.3–5)
ALP SERPL-CCNC: 140 U/L — HIGH (ref 40–120)
ALP SERPL-CCNC: 140 U/L — HIGH (ref 40–120)
ALT FLD-CCNC: 22 U/L — SIGNIFICANT CHANGE UP (ref 4–33)
ALT FLD-CCNC: 22 U/L — SIGNIFICANT CHANGE UP (ref 4–33)
ANION GAP SERPL CALC-SCNC: 11 MMOL/L — SIGNIFICANT CHANGE UP (ref 7–14)
ANION GAP SERPL CALC-SCNC: 11 MMOL/L — SIGNIFICANT CHANGE UP (ref 7–14)
APPEARANCE UR: ABNORMAL
APPEARANCE UR: ABNORMAL
APTT BLD: 25.1 SEC — SIGNIFICANT CHANGE UP (ref 24.5–35.6)
APTT BLD: 25.1 SEC — SIGNIFICANT CHANGE UP (ref 24.5–35.6)
AST SERPL-CCNC: 9 U/L — SIGNIFICANT CHANGE UP (ref 4–32)
AST SERPL-CCNC: 9 U/L — SIGNIFICANT CHANGE UP (ref 4–32)
BACTERIA # UR AUTO: NEGATIVE /HPF — SIGNIFICANT CHANGE UP
BACTERIA # UR AUTO: NEGATIVE /HPF — SIGNIFICANT CHANGE UP
BASOPHILS # BLD AUTO: 0.02 K/UL — SIGNIFICANT CHANGE UP (ref 0–0.2)
BASOPHILS # BLD AUTO: 0.02 K/UL — SIGNIFICANT CHANGE UP (ref 0–0.2)
BASOPHILS NFR BLD AUTO: 0.2 % — SIGNIFICANT CHANGE UP (ref 0–2)
BASOPHILS NFR BLD AUTO: 0.2 % — SIGNIFICANT CHANGE UP (ref 0–2)
BILIRUB SERPL-MCNC: 0.9 MG/DL — SIGNIFICANT CHANGE UP (ref 0.2–1.2)
BILIRUB SERPL-MCNC: 0.9 MG/DL — SIGNIFICANT CHANGE UP (ref 0.2–1.2)
BILIRUB UR-MCNC: NEGATIVE — SIGNIFICANT CHANGE UP
BILIRUB UR-MCNC: NEGATIVE — SIGNIFICANT CHANGE UP
BUN SERPL-MCNC: 4 MG/DL — LOW (ref 7–23)
BUN SERPL-MCNC: 4 MG/DL — LOW (ref 7–23)
CALCIUM SERPL-MCNC: 9.2 MG/DL — SIGNIFICANT CHANGE UP (ref 8.4–10.5)
CALCIUM SERPL-MCNC: 9.2 MG/DL — SIGNIFICANT CHANGE UP (ref 8.4–10.5)
CAST: 1 /LPF — SIGNIFICANT CHANGE UP (ref 0–4)
CAST: 1 /LPF — SIGNIFICANT CHANGE UP (ref 0–4)
CHLORIDE SERPL-SCNC: 107 MMOL/L — SIGNIFICANT CHANGE UP (ref 98–107)
CHLORIDE SERPL-SCNC: 107 MMOL/L — SIGNIFICANT CHANGE UP (ref 98–107)
CO2 SERPL-SCNC: 22 MMOL/L — SIGNIFICANT CHANGE UP (ref 22–31)
CO2 SERPL-SCNC: 22 MMOL/L — SIGNIFICANT CHANGE UP (ref 22–31)
COLOR SPEC: YELLOW — SIGNIFICANT CHANGE UP
COLOR SPEC: YELLOW — SIGNIFICANT CHANGE UP
CREAT ?TM UR-MCNC: 28 MG/DL — SIGNIFICANT CHANGE UP
CREAT ?TM UR-MCNC: 28 MG/DL — SIGNIFICANT CHANGE UP
CREAT SERPL-MCNC: 0.58 MG/DL — SIGNIFICANT CHANGE UP (ref 0.5–1.3)
CREAT SERPL-MCNC: 0.58 MG/DL — SIGNIFICANT CHANGE UP (ref 0.5–1.3)
DIFF PNL FLD: NEGATIVE — SIGNIFICANT CHANGE UP
DIFF PNL FLD: NEGATIVE — SIGNIFICANT CHANGE UP
EGFR: 128 ML/MIN/1.73M2 — SIGNIFICANT CHANGE UP
EGFR: 128 ML/MIN/1.73M2 — SIGNIFICANT CHANGE UP
EOSINOPHIL # BLD AUTO: 0.04 K/UL — SIGNIFICANT CHANGE UP (ref 0–0.5)
EOSINOPHIL # BLD AUTO: 0.04 K/UL — SIGNIFICANT CHANGE UP (ref 0–0.5)
EOSINOPHIL NFR BLD AUTO: 0.5 % — SIGNIFICANT CHANGE UP (ref 0–6)
EOSINOPHIL NFR BLD AUTO: 0.5 % — SIGNIFICANT CHANGE UP (ref 0–6)
FIBRINOGEN PPP-MCNC: 411 MG/DL — SIGNIFICANT CHANGE UP (ref 200–465)
FIBRINOGEN PPP-MCNC: 411 MG/DL — SIGNIFICANT CHANGE UP (ref 200–465)
GLUCOSE SERPL-MCNC: 70 MG/DL — SIGNIFICANT CHANGE UP (ref 70–99)
GLUCOSE SERPL-MCNC: 70 MG/DL — SIGNIFICANT CHANGE UP (ref 70–99)
GLUCOSE UR QL: NEGATIVE MG/DL — SIGNIFICANT CHANGE UP
GLUCOSE UR QL: NEGATIVE MG/DL — SIGNIFICANT CHANGE UP
HCT VFR BLD CALC: 34.7 % — SIGNIFICANT CHANGE UP (ref 34.5–45)
HCT VFR BLD CALC: 34.7 % — SIGNIFICANT CHANGE UP (ref 34.5–45)
HGB BLD-MCNC: 11.5 G/DL — SIGNIFICANT CHANGE UP (ref 11.5–15.5)
HGB BLD-MCNC: 11.5 G/DL — SIGNIFICANT CHANGE UP (ref 11.5–15.5)
IANC: 5.54 K/UL — SIGNIFICANT CHANGE UP (ref 1.8–7.4)
IANC: 5.54 K/UL — SIGNIFICANT CHANGE UP (ref 1.8–7.4)
IMM GRANULOCYTES NFR BLD AUTO: 1.1 % — HIGH (ref 0–0.9)
IMM GRANULOCYTES NFR BLD AUTO: 1.1 % — HIGH (ref 0–0.9)
INR BLD: 0.95 RATIO — SIGNIFICANT CHANGE UP (ref 0.85–1.18)
INR BLD: 0.95 RATIO — SIGNIFICANT CHANGE UP (ref 0.85–1.18)
KETONES UR-MCNC: NEGATIVE MG/DL — SIGNIFICANT CHANGE UP
KETONES UR-MCNC: NEGATIVE MG/DL — SIGNIFICANT CHANGE UP
LDH SERPL L TO P-CCNC: 253 U/L — HIGH (ref 135–225)
LDH SERPL L TO P-CCNC: 253 U/L — HIGH (ref 135–225)
LEUKOCYTE ESTERASE UR-ACNC: NEGATIVE — SIGNIFICANT CHANGE UP
LEUKOCYTE ESTERASE UR-ACNC: NEGATIVE — SIGNIFICANT CHANGE UP
LYMPHOCYTES # BLD AUTO: 1.43 K/UL — SIGNIFICANT CHANGE UP (ref 1–3.3)
LYMPHOCYTES # BLD AUTO: 1.43 K/UL — SIGNIFICANT CHANGE UP (ref 1–3.3)
LYMPHOCYTES # BLD AUTO: 17.8 % — SIGNIFICANT CHANGE UP (ref 13–44)
LYMPHOCYTES # BLD AUTO: 17.8 % — SIGNIFICANT CHANGE UP (ref 13–44)
MCHC RBC-ENTMCNC: 30.9 PG — SIGNIFICANT CHANGE UP (ref 27–34)
MCHC RBC-ENTMCNC: 30.9 PG — SIGNIFICANT CHANGE UP (ref 27–34)
MCHC RBC-ENTMCNC: 33.1 GM/DL — SIGNIFICANT CHANGE UP (ref 32–36)
MCHC RBC-ENTMCNC: 33.1 GM/DL — SIGNIFICANT CHANGE UP (ref 32–36)
MCV RBC AUTO: 93.3 FL — SIGNIFICANT CHANGE UP (ref 80–100)
MCV RBC AUTO: 93.3 FL — SIGNIFICANT CHANGE UP (ref 80–100)
MONOCYTES # BLD AUTO: 0.93 K/UL — HIGH (ref 0–0.9)
MONOCYTES # BLD AUTO: 0.93 K/UL — HIGH (ref 0–0.9)
MONOCYTES NFR BLD AUTO: 11.6 % — SIGNIFICANT CHANGE UP (ref 2–14)
MONOCYTES NFR BLD AUTO: 11.6 % — SIGNIFICANT CHANGE UP (ref 2–14)
NEUTROPHILS # BLD AUTO: 5.54 K/UL — SIGNIFICANT CHANGE UP (ref 1.8–7.4)
NEUTROPHILS # BLD AUTO: 5.54 K/UL — SIGNIFICANT CHANGE UP (ref 1.8–7.4)
NEUTROPHILS NFR BLD AUTO: 68.8 % — SIGNIFICANT CHANGE UP (ref 43–77)
NEUTROPHILS NFR BLD AUTO: 68.8 % — SIGNIFICANT CHANGE UP (ref 43–77)
NITRITE UR-MCNC: NEGATIVE — SIGNIFICANT CHANGE UP
NITRITE UR-MCNC: NEGATIVE — SIGNIFICANT CHANGE UP
NRBC # BLD: 0 /100 WBCS — SIGNIFICANT CHANGE UP (ref 0–0)
NRBC # BLD: 0 /100 WBCS — SIGNIFICANT CHANGE UP (ref 0–0)
NRBC # FLD: 0 K/UL — SIGNIFICANT CHANGE UP (ref 0–0)
NRBC # FLD: 0 K/UL — SIGNIFICANT CHANGE UP (ref 0–0)
PH UR: 6.5 — SIGNIFICANT CHANGE UP (ref 5–8)
PH UR: 6.5 — SIGNIFICANT CHANGE UP (ref 5–8)
PLATELET # BLD AUTO: 190 K/UL — SIGNIFICANT CHANGE UP (ref 150–400)
PLATELET # BLD AUTO: 190 K/UL — SIGNIFICANT CHANGE UP (ref 150–400)
POTASSIUM SERPL-MCNC: 3.7 MMOL/L — SIGNIFICANT CHANGE UP (ref 3.5–5.3)
POTASSIUM SERPL-MCNC: 3.7 MMOL/L — SIGNIFICANT CHANGE UP (ref 3.5–5.3)
POTASSIUM SERPL-SCNC: 3.7 MMOL/L — SIGNIFICANT CHANGE UP (ref 3.5–5.3)
POTASSIUM SERPL-SCNC: 3.7 MMOL/L — SIGNIFICANT CHANGE UP (ref 3.5–5.3)
PROT ?TM UR-MCNC: 10 MG/DL — SIGNIFICANT CHANGE UP
PROT ?TM UR-MCNC: 10 MG/DL — SIGNIFICANT CHANGE UP
PROT SERPL-MCNC: 5.9 G/DL — LOW (ref 6–8.3)
PROT SERPL-MCNC: 5.9 G/DL — LOW (ref 6–8.3)
PROT UR-MCNC: NEGATIVE MG/DL — SIGNIFICANT CHANGE UP
PROT UR-MCNC: NEGATIVE MG/DL — SIGNIFICANT CHANGE UP
PROT/CREAT UR-RTO: 0.4 RATIO — HIGH (ref 0–0.2)
PROT/CREAT UR-RTO: 0.4 RATIO — HIGH (ref 0–0.2)
PROTHROM AB SERPL-ACNC: 10.7 SEC — SIGNIFICANT CHANGE UP (ref 9.5–13)
PROTHROM AB SERPL-ACNC: 10.7 SEC — SIGNIFICANT CHANGE UP (ref 9.5–13)
RBC # BLD: 3.72 M/UL — LOW (ref 3.8–5.2)
RBC # BLD: 3.72 M/UL — LOW (ref 3.8–5.2)
RBC # FLD: 13.6 % — SIGNIFICANT CHANGE UP (ref 10.3–14.5)
RBC # FLD: 13.6 % — SIGNIFICANT CHANGE UP (ref 10.3–14.5)
RBC CASTS # UR COMP ASSIST: 1 /HPF — SIGNIFICANT CHANGE UP (ref 0–4)
RBC CASTS # UR COMP ASSIST: 1 /HPF — SIGNIFICANT CHANGE UP (ref 0–4)
SODIUM SERPL-SCNC: 140 MMOL/L — SIGNIFICANT CHANGE UP (ref 135–145)
SODIUM SERPL-SCNC: 140 MMOL/L — SIGNIFICANT CHANGE UP (ref 135–145)
SP GR SPEC: 1.01 — SIGNIFICANT CHANGE UP (ref 1–1.03)
SP GR SPEC: 1.01 — SIGNIFICANT CHANGE UP (ref 1–1.03)
SQUAMOUS # UR AUTO: 3 /HPF — SIGNIFICANT CHANGE UP (ref 0–5)
SQUAMOUS # UR AUTO: 3 /HPF — SIGNIFICANT CHANGE UP (ref 0–5)
URATE SERPL-MCNC: 4.4 MG/DL — SIGNIFICANT CHANGE UP (ref 2.5–7)
URATE SERPL-MCNC: 4.4 MG/DL — SIGNIFICANT CHANGE UP (ref 2.5–7)
UROBILINOGEN FLD QL: 0.2 MG/DL — SIGNIFICANT CHANGE UP (ref 0.2–1)
UROBILINOGEN FLD QL: 0.2 MG/DL — SIGNIFICANT CHANGE UP (ref 0.2–1)
WBC # BLD: 8.05 K/UL — SIGNIFICANT CHANGE UP (ref 3.8–10.5)
WBC # BLD: 8.05 K/UL — SIGNIFICANT CHANGE UP (ref 3.8–10.5)
WBC # FLD AUTO: 8.05 K/UL — SIGNIFICANT CHANGE UP (ref 3.8–10.5)
WBC # FLD AUTO: 8.05 K/UL — SIGNIFICANT CHANGE UP (ref 3.8–10.5)
WBC UR QL: 1 /HPF — SIGNIFICANT CHANGE UP (ref 0–5)
WBC UR QL: 1 /HPF — SIGNIFICANT CHANGE UP (ref 0–5)

## 2023-12-15 PROCEDURE — 76818 FETAL BIOPHYS PROFILE W/NST: CPT

## 2023-12-15 PROCEDURE — 76818 FETAL BIOPHYS PROFILE W/NST: CPT | Mod: 59

## 2023-12-15 PROCEDURE — 59025 FETAL NON-STRESS TEST: CPT | Mod: 26

## 2023-12-15 PROCEDURE — 76820 UMBILICAL ARTERY ECHO: CPT

## 2023-12-15 PROCEDURE — 99221 1ST HOSP IP/OBS SF/LOW 40: CPT | Mod: 25

## 2023-12-15 PROCEDURE — 76821 MIDDLE CEREBRAL ARTERY ECHO: CPT

## 2023-12-15 RX ORDER — SODIUM CHLORIDE 9 MG/ML
1000 INJECTION, SOLUTION INTRAVENOUS ONCE
Refills: 0 | Status: COMPLETED | OUTPATIENT
Start: 2023-12-15 | End: 2023-12-15

## 2023-12-15 RX ADMIN — SODIUM CHLORIDE 1000 MILLILITER(S): 9 INJECTION, SOLUTION INTRAVENOUS at 22:48

## 2023-12-15 NOTE — OB PROVIDER TRIAGE NOTE - NSHPLABSRESULTS_GEN_ALL_CORE
Urinalysis Basic - ( 15 Dec 2023 21:24 )    Color: Yellow / Appearance: Cloudy / S.006 / pH: x  Gluc: x / Ketone: Negative mg/dL  / Bili: Negative / Urobili: 0.2 mg/dL   Blood: x / Protein: Negative mg/dL / Nitrite: Negative   Leuk Esterase: Negative / RBC: 1 /HPF / WBC 1 /HPF   Sq Epi: x / Non Sq Epi: 3 /HPF / Bacteria: Negative /HPF 11.5   8.05  )-----------( 190      ( 15 Dec 2023 22:47 )             34.7     PT/INR - ( 15 Dec 2023 22:47 )   PT: 10.7 sec;   INR: 0.95 ratio    PTT - ( 15 Dec 2023 22:47 )  PTT:25.1 sec  Fibrinogen     12-15    140  |  107  |  4<L>  ----------------------------<  70  3.7   |  22  |  0.58    Ca    9.2      15 Dec 2023 22:47    TPro  5.9<L>  /  Alb  3.4  /  TBili  0.9  /  DBili  x   /  AST  9   /  ALT  22  /  AlkPhos  140<H>  12-15      PC Ratio: 0.4      Urinalysis Basic - ( 15 Dec 2023 21:24 )    Color: Yellow / Appearance: Cloudy / S.006 / pH: x  Gluc: x / Ketone: Negative mg/dL  / Bili: Negative / Urobili: 0.2 mg/dL   Blood: x / Protein: Negative mg/dL / Nitrite: Negative   Leuk Esterase: Negative / RBC: 1 /HPF / WBC 1 /HPF   Sq Epi: x / Non Sq Epi: 3 /HPF / Bacteria: Negative /HPF

## 2023-12-15 NOTE — OB RN TRIAGE NOTE - CHIEF COMPLAINT QUOTE
Lower abdominal pain and back pain . Twin gestation Lower abdominal pain and back pain . Twin gestation - Breech PRES

## 2023-12-15 NOTE — OB RN TRIAGE NOTE - FALL HARM RISK - UNIVERSAL INTERVENTIONS
Bed in lowest position, wheels locked, appropriate side rails in place/Call bell, personal items and telephone in reach/Instruct patient to call for assistance before getting out of bed or chair/Non-slip footwear when patient is out of bed/[place] to call system/Physically safe environment - no spills, clutter or unnecessary equipment/Purposeful Proactive Rounding/Room/bathroom lighting operational, light cord in reach Bed in lowest position, wheels locked, appropriate side rails in place/Call bell, personal items and telephone in reach/Instruct patient to call for assistance before getting out of bed or chair/Non-slip footwear when patient is out of bed/[place] to call system/Physically safe environment - no spills, clutter or unnecessary equipment/Purposeful Proactive Rounding/Room/bathroom lighting operational, light cord in reach

## 2023-12-15 NOTE — OB PROVIDER TRIAGE NOTE - HISTORY OF PRESENT ILLNESS
Pt. is a 27y/o  EGA 34.5wks c/w natural Mono-Di TIUP. Pt. reports of lower abdominal pain and lower back pain since earlier today. Pt. states now she no longer feels lower abdominal pain but feels lower back pain that has spaced out. Pt. is scheduled for primary  on 23 for breech presentation.     AP: Mono/Di TIUPs   Pt. is a 29y/o  EGA 34.5wks c/w natural Mono-Di TIUP. Pt. reports of lower abdominal pain and lower back pain since earlier today. Pt. states now she no longer feels lower abdominal pain but feels lower back pain that has spaced out. Pt. is scheduled for primary  on 23 for breech presentation.     AP: Mono/Di TIUPs   Pt. is a 27y/o  EGA 34.5wks c/w natural Mono-Di TIUP. Pt. reports of lower abdominal pain and lower back pain since earlier today. Pt. states now she no longer feels lower abdominal pain but feels lower back pain that has spaced out. Pt. is scheduled for primary  on 23 for breech presentation x2.     AP: Mono/Di TIUPs

## 2023-12-15 NOTE — OB PROVIDER TRIAGE NOTE - NSOBPROVIDERNOTE_OBGYN_ALL_OB_FT
1L RL bolus ordered   @HELLP labs ordered           No evidence of  labor at this time, discussed findings with Dr. Gonzalez. Pt. d/c'd home. Pt. to keep appointment with OB on Monday (23). Pt. instructed to return to triage with increase pain with contractions, leakage of fluid, vaginal bleeding or decrease fetal movement. Pt. instructed to take blood pressure x2 a day and to start 24hr urine collection on  (23) to bring to OB's office on Monday. Pt. instructed to return to triage with headache unresolved with Tylenol, visual changes, epigastric/RUQ pain, and N/V. 1L RL bolus ordered   @2230  HELLP labs ordered     @9147  Pt. states contractions have spaced out  SVE: closed/Long (unchanged)   Category I/Reactive NST Baby A and Baby B  Anna irregularly     No evidence of  labor at this time, discussed findings with Dr. Gonzalez. Pt. d/c'd home. Pt. to keep appointment with OB on Monday (23). Pt. instructed to return to triage with increase pain with contractions, leakage of fluid, vaginal bleeding or decrease fetal movement. Pt. instructed to take blood pressure x2 a day and to start 24hr urine collection on  (23) to bring to OB's office on Monday. Pt. instructed to return to triage with headache unresolved with Tylenol, visual changes, epigastric/RUQ pain, and N/V. 1L RL bolus ordered   @2230  HELLP labs ordered     @7897  Pt. states contractions have spaced out  SVE: closed/Long (unchanged)   Category I/Reactive NST Baby A and Baby B  Anna irregularly     No evidence of  labor at this time, discussed findings with Dr. Gonzalez. Pt. d/c'd home. Pt. to keep appointment with OB on Monday (23). Pt. instructed to return to triage with increase pain with contractions, leakage of fluid, vaginal bleeding or decrease fetal movement. Pt. instructed to take blood pressure x2 a day and to start 24hr urine collection on  (23) to bring to OB's office on Monday. Pt. instructed to return to triage with headache unresolved with Tylenol, visual changes, epigastric/RUQ pain, and N/V.

## 2023-12-15 NOTE — OB PROVIDER TRIAGE NOTE - ADDITIONAL INSTRUCTIONS
No evidence of  labor at this time, discussed findings with Dr. Gonzalez. Pt. d/c'd home. Pt. to keep appointment with OB on Monday (23). Pt. instructed to return to triage with increase pain with contractions, leakage of fluid, vaginal bleeding or decrease fetal movement. Pt. instructed to take blood pressure x2 a day and to start 24hr urine collection on  (23) to bring to OB's office on Monday. Pt. instructed to return to triage with headache unresolved with Tylenol, visual changes, epigastric/RUQ pain, and N/V.

## 2023-12-18 DIAGNOSIS — M54.50 LOW BACK PAIN, UNSPECIFIED: ICD-10-CM

## 2023-12-18 DIAGNOSIS — J45.909 UNSPECIFIED ASTHMA, UNCOMPLICATED: ICD-10-CM

## 2023-12-18 DIAGNOSIS — O13.3 GESTATIONAL [PREGNANCY-INDUCED] HYPERTENSION WITHOUT SIGNIFICANT PROTEINURIA, THIRD TRIMESTER: ICD-10-CM

## 2023-12-18 DIAGNOSIS — Z3A.34 34 WEEKS GESTATION OF PREGNANCY: ICD-10-CM

## 2023-12-18 DIAGNOSIS — R10.30 LOWER ABDOMINAL PAIN, UNSPECIFIED: ICD-10-CM

## 2023-12-18 DIAGNOSIS — O30.033 TWIN PREGNANCY, MONOCHORIONIC/DIAMNIOTIC, THIRD TRIMESTER: ICD-10-CM

## 2023-12-18 DIAGNOSIS — O26.893 OTHER SPECIFIED PREGNANCY RELATED CONDITIONS, THIRD TRIMESTER: ICD-10-CM

## 2023-12-18 DIAGNOSIS — O99.891 OTHER SPECIFIED DISEASES AND CONDITIONS COMPLICATING PREGNANCY: ICD-10-CM

## 2023-12-18 DIAGNOSIS — O99.513 DISEASES OF THE RESPIRATORY SYSTEM COMPLICATING PREGNANCY, THIRD TRIMESTER: ICD-10-CM

## 2023-12-19 ENCOUNTER — APPOINTMENT (OUTPATIENT)
Dept: ANTEPARTUM | Facility: CLINIC | Age: 28
End: 2023-12-19
Payer: COMMERCIAL

## 2023-12-19 ENCOUNTER — ASOB RESULT (OUTPATIENT)
Age: 28
End: 2023-12-19

## 2023-12-19 PROCEDURE — 76818 FETAL BIOPHYS PROFILE W/NST: CPT | Mod: 59

## 2023-12-19 PROCEDURE — 76818 FETAL BIOPHYS PROFILE W/NST: CPT

## 2023-12-19 PROCEDURE — 76821 MIDDLE CEREBRAL ARTERY ECHO: CPT | Mod: 59

## 2023-12-19 PROCEDURE — 76821 MIDDLE CEREBRAL ARTERY ECHO: CPT

## 2023-12-20 ENCOUNTER — OUTPATIENT (OUTPATIENT)
Dept: OUTPATIENT SERVICES | Facility: HOSPITAL | Age: 28
LOS: 1 days | End: 2023-12-20

## 2023-12-20 VITALS
WEIGHT: 212.08 LBS | HEART RATE: 96 BPM | HEIGHT: 65.5 IN | DIASTOLIC BLOOD PRESSURE: 84 MMHG | SYSTOLIC BLOOD PRESSURE: 132 MMHG | OXYGEN SATURATION: 98 % | RESPIRATION RATE: 16 BRPM | TEMPERATURE: 99 F

## 2023-12-20 DIAGNOSIS — O30.039 TWIN PREGNANCY, MONOCHORIONIC/DIAMNIOTIC, UNSPECIFIED TRIMESTER: ICD-10-CM

## 2023-12-20 DIAGNOSIS — J45.909 UNSPECIFIED ASTHMA, UNCOMPLICATED: ICD-10-CM

## 2023-12-20 DIAGNOSIS — Z98.89 OTHER SPECIFIED POSTPROCEDURAL STATES: Chronic | ICD-10-CM

## 2023-12-20 DIAGNOSIS — O30.033 TWIN PREGNANCY, MONOCHORIONIC/DIAMNIOTIC, THIRD TRIMESTER: ICD-10-CM

## 2023-12-20 LAB
APPEARANCE UR: CLEAR — SIGNIFICANT CHANGE UP
APPEARANCE UR: CLEAR — SIGNIFICANT CHANGE UP
BILIRUB UR-MCNC: NEGATIVE — SIGNIFICANT CHANGE UP
BILIRUB UR-MCNC: NEGATIVE — SIGNIFICANT CHANGE UP
BLD GP AB SCN SERPL QL: NEGATIVE — SIGNIFICANT CHANGE UP
BLD GP AB SCN SERPL QL: NEGATIVE — SIGNIFICANT CHANGE UP
COLOR SPEC: YELLOW — SIGNIFICANT CHANGE UP
COLOR SPEC: YELLOW — SIGNIFICANT CHANGE UP
DIFF PNL FLD: NEGATIVE — SIGNIFICANT CHANGE UP
DIFF PNL FLD: NEGATIVE — SIGNIFICANT CHANGE UP
GLUCOSE UR QL: NEGATIVE MG/DL — SIGNIFICANT CHANGE UP
GLUCOSE UR QL: NEGATIVE MG/DL — SIGNIFICANT CHANGE UP
HCT VFR BLD CALC: 32.9 % — LOW (ref 34.5–45)
HCT VFR BLD CALC: 32.9 % — LOW (ref 34.5–45)
HGB BLD-MCNC: 11.8 G/DL — SIGNIFICANT CHANGE UP (ref 11.5–15.5)
HGB BLD-MCNC: 11.8 G/DL — SIGNIFICANT CHANGE UP (ref 11.5–15.5)
KETONES UR-MCNC: NEGATIVE MG/DL — SIGNIFICANT CHANGE UP
KETONES UR-MCNC: NEGATIVE MG/DL — SIGNIFICANT CHANGE UP
LEUKOCYTE ESTERASE UR-ACNC: NEGATIVE — SIGNIFICANT CHANGE UP
LEUKOCYTE ESTERASE UR-ACNC: NEGATIVE — SIGNIFICANT CHANGE UP
MCHC RBC-ENTMCNC: 32.6 PG — SIGNIFICANT CHANGE UP (ref 27–34)
MCHC RBC-ENTMCNC: 32.6 PG — SIGNIFICANT CHANGE UP (ref 27–34)
MCHC RBC-ENTMCNC: 35.9 GM/DL — SIGNIFICANT CHANGE UP (ref 32–36)
MCHC RBC-ENTMCNC: 35.9 GM/DL — SIGNIFICANT CHANGE UP (ref 32–36)
MCV RBC AUTO: 90.9 FL — SIGNIFICANT CHANGE UP (ref 80–100)
MCV RBC AUTO: 90.9 FL — SIGNIFICANT CHANGE UP (ref 80–100)
NITRITE UR-MCNC: NEGATIVE — SIGNIFICANT CHANGE UP
NITRITE UR-MCNC: NEGATIVE — SIGNIFICANT CHANGE UP
NRBC # BLD: 0 /100 WBCS — SIGNIFICANT CHANGE UP (ref 0–0)
NRBC # BLD: 0 /100 WBCS — SIGNIFICANT CHANGE UP (ref 0–0)
NRBC # FLD: 0.02 K/UL — HIGH (ref 0–0)
NRBC # FLD: 0.02 K/UL — HIGH (ref 0–0)
PH UR: 7 — SIGNIFICANT CHANGE UP (ref 5–8)
PH UR: 7 — SIGNIFICANT CHANGE UP (ref 5–8)
PLATELET # BLD AUTO: 207 K/UL — SIGNIFICANT CHANGE UP (ref 150–400)
PLATELET # BLD AUTO: 207 K/UL — SIGNIFICANT CHANGE UP (ref 150–400)
PROT UR-MCNC: NEGATIVE MG/DL — SIGNIFICANT CHANGE UP
PROT UR-MCNC: NEGATIVE MG/DL — SIGNIFICANT CHANGE UP
RBC # BLD: 3.62 M/UL — LOW (ref 3.8–5.2)
RBC # BLD: 3.62 M/UL — LOW (ref 3.8–5.2)
RBC # FLD: 13.3 % — SIGNIFICANT CHANGE UP (ref 10.3–14.5)
RBC # FLD: 13.3 % — SIGNIFICANT CHANGE UP (ref 10.3–14.5)
RH IG SCN BLD-IMP: POSITIVE — SIGNIFICANT CHANGE UP
RH IG SCN BLD-IMP: POSITIVE — SIGNIFICANT CHANGE UP
SP GR SPEC: 1 — SIGNIFICANT CHANGE UP (ref 1–1.03)
SP GR SPEC: 1 — SIGNIFICANT CHANGE UP (ref 1–1.03)
UROBILINOGEN FLD QL: 0.2 MG/DL — SIGNIFICANT CHANGE UP (ref 0.2–1)
UROBILINOGEN FLD QL: 0.2 MG/DL — SIGNIFICANT CHANGE UP (ref 0.2–1)
WBC # BLD: 8.42 K/UL — SIGNIFICANT CHANGE UP (ref 3.8–10.5)
WBC # BLD: 8.42 K/UL — SIGNIFICANT CHANGE UP (ref 3.8–10.5)
WBC # FLD AUTO: 8.42 K/UL — SIGNIFICANT CHANGE UP (ref 3.8–10.5)
WBC # FLD AUTO: 8.42 K/UL — SIGNIFICANT CHANGE UP (ref 3.8–10.5)

## 2023-12-20 RX ORDER — BUDESONIDE AND FORMOTEROL FUMARATE DIHYDRATE 160; 4.5 UG/1; UG/1
2 AEROSOL RESPIRATORY (INHALATION)
Refills: 0 | DISCHARGE

## 2023-12-20 RX ORDER — OXYTOCIN 10 UNIT/ML
333.33 VIAL (ML) INJECTION
Qty: 20 | Refills: 0 | Status: DISCONTINUED | OUTPATIENT
Start: 2023-12-26 | End: 2023-12-26

## 2023-12-20 RX ORDER — SODIUM CHLORIDE 9 MG/ML
1000 INJECTION, SOLUTION INTRAVENOUS
Refills: 0 | Status: DISCONTINUED | OUTPATIENT
Start: 2023-12-26 | End: 2023-12-26

## 2023-12-20 RX ORDER — SODIUM CHLORIDE 9 MG/ML
1000 INJECTION, SOLUTION INTRAVENOUS ONCE
Refills: 0 | Status: DISCONTINUED | OUTPATIENT
Start: 2023-12-26 | End: 2023-12-26

## 2023-12-20 NOTE — OB PST NOTE - NSICDXPASTMEDICALHX_GEN_ALL_CORE_FT
PAST MEDICAL HISTORY:  Asthma     Maternal care for breech presentation     Monochorionic diamniotic twin pregnancy     Sickle cell trait

## 2023-12-20 NOTE — OB PST NOTE - ASSESSMENT
27 y/o female presents to [facility] preop for primary  section due to breech presentations and monochrionic/ diamniotic twins.  29 y/o female presents to [facility] preop for primary  section due to breech presentations and monochrionic/ diamniotic twins.

## 2023-12-20 NOTE — OB PST NOTE - HISTORY OF PRESENT ILLNESS
27 y/o female presents to [facility] preop for primary  section due to breech presentations and monochrionic/ diamniotic twins.   27 y/o female presents to [facility] preop for primary  section due to breech presentations and monochrionic/ diamniotic twins.   27 y/o female presents to [facility] preop for primary  section due to breech presentations and monochorionic/ diamniotic twins.   27 y/o female presents to [facility] preop for primary  section due to breech presentations and monochorionic/ diamniotic twins.

## 2023-12-20 NOTE — OB PST NOTE - NSHPPHYSICALEXAM_GEN_ALL_CORE
Constitutional: Well Developed, Well Groomed, Well Nourished, No Distress    Eyes: PERRL, EOMI, conjunctiva clear    Ears: Normal    Mouth & Gums: Normal, moist    Pharynx: No tenderness, discharge, or peritonsillar abscess    Tonsils: No Redness, discharge, tenderness, or swelling    Neck: Supple, no JVD, normal thyroid glands, no carotid bruits, no cervical vertebral or paraspinal tenderness    Breast: Normal shape, no masses, no tenderness, nipples normal, no nipple discharge    Back: Normal shape, ROM intact, strength intact, no vertebral tenderness    Respiratory: Airway patent, breath sounds equal, good air movement, respiration non-labored, clear to auscultation bilateral, no chest wall tenderness, no intercostal retractions, no rales, no wheezes, no rhonchi, no subcutaneous emphysema    Cardiovascular:  Regular rate and rhythm, no rubs or murmur, normal PMI    Gastrointestinal: Soft, non-tender, non distention, no masses palpable, bowel sound normal, no bruit, no rebound tenderness    Extremities: No clubbing, cyanosis, or pedal edema    Vascular:  Carotid Pulse normal , Radial Pulse normal, Femoral Pulse normal, DP pulse normal, PT pulse normal    Neurological: alert & oriented x 3, sensation intact, deep reflexes intact, cranial nerve intact, normal strength    Skin: warm and dry, normal color    Lymph Nodes: normal posterior cervical lymph node, normal anterior cervical lymph node, normal supraclavicular lymph node, normal axillary lymph node, normal inguinal lymph node, normal femoral lymph node    Musculoskeletal: ROM intact, no joint swelling, warmth, or calf tenderness. Normal strength    Psychiatric: normal affect, normal behavior Constitutional: Well Developed, Well Groomed, Well Nourished, No Distress    Eyes: PERRL, EOMI, conjunctiva clear    Ears: Normal    Mouth & Gums: Normal, moist    Pharynx: No tenderness, discharge, or peritonsillar abscess    Tonsils: No Redness, discharge, tenderness, or swelling    Neck: Supple, no JVD, normal thyroid glands, no carotid bruits, no cervical vertebral or paraspinal tenderness    Breast: Normal shape    Back: Normal shape, ROM intact, strength intact, no vertebral tenderness    Respiratory: Airway patent, breath sounds equal, good air movement, respiration non-labored, clear to auscultation bilateral, no chest wall tenderness, no intercostal retractions, no rales, no wheezes, no rhonchi, no subcutaneous emphysema    Cardiovascular:  Regular rate and rhythm, no rubs or murmur, normal PMI    Gastrointestinal: pregnant abdomen. pt reports fetal movements during PST    Extremities: swelling to bilateral LE, non pitting     Vascular:  Carotid Pulse normal , Radial Pulse normal    Neurological: alert & oriented x 3, sensation intact, deep reflexes intact, cranial nerve intact, normal strength    Skin: warm and dry, normal color    Lymph Nodes: normal posterior cervical lymph node, normal anterior cervical lymph node, normal supraclavicular lymph node    Musculoskeletal: ROM intact, no joint swelling, warmth, or calf tenderness. Normal strength    Psychiatric: normal affect, normal behavior

## 2023-12-20 NOTE — OB PST NOTE - PROBLEM SELECTOR PLAN 1
preop for primary  section due to breech presentations and monochorionic/ diamniotic twins on 23  preop instructions given, pt verbalized understanding  chlorhexidine wash provided  cbc, ua, type pending

## 2023-12-20 NOTE — OB PST NOTE - NSHPREVIEWOFSYSTEMS_GEN_ALL_CORE
General: pregnancy weight gain. No fever, chills, sweating, anorexia, weight loss. No polyphagia, polyuria, polydipsia, malaise, or fatigue    Skin: No rashes, itching, or dryness. No change in size/color of moles. No tumors, brittle nails, pitted nails, or hair loss    Breast: No tenderness, lumps, or nipple discharge      Ophthalmologic: No diplopia, photophobia, lacrimation, blurred Vision , or eye discharge    ENMT Symptoms: nasal congestion. No hearing difficulty, ear pain, tinnitus, or vertigo. No sinus symptoms, nasal   discharge, or nasal obstruction.     Respiratory and Thorax: h/o Asthma, controlled. Pt denies recent exacerbation. On prescription inhalers. No wheezing, dyspnea, cough, hemoptysis, or pleuritic chest pain     Cardiovascular: No chest pain, palpitations, dyspnea on exertion, orthopnea, paroxysmal nocturnal dyspnea,   peripheral edema, or claudication    Gastrointestinal:  Nausea. No vomiting, diarrhea, constipation, change in bowel habits, flatulence, abdominal pain, or melena    Genitourinary/ Pelvis: No hematuria, renal colic, or flank pain.  No urine discoloration, incontinence, dysuria, or urinary hesitancy. Normal urinary frequency. No nocturia, abnormal vaginal bleeding, vaginal discharge, spotting, pelvic pain, or vaginal leakage    Musculoskeletal: lower back pain. No arthralgia, arthritis, joint swelling, muscle cramping, muscle weakness, neck pain, arm pain, or leg pain    Neurological: No transient paralysis, weakness, paresthesias, or seizures. No syncope, tremors, vertigo, loss of sensation, difficulty walking, loss of consciousness, hemiparesis, confusion, or facial palsy    Psychiatric: No suicidal ideation, depression, anxiety, insomnia, memory loss, paranoia, mood swings, agitation, hallucinations, or hyperactivity    Hematology: No gum bleeding, nose bleeding, or skin lumps    Lymphatic: No enlarged or tender lymph nodes. No extremity swelling    Endocrine: No heat or cold intolerance    Immunologic: No recurrent or persistent infections General: pregnancy weight gain. No fever, chills, sweating, anorexia, weight loss. No polyphagia, polyuria, polydipsia, malaise, or fatigue    Skin: No rashes, itching, or dryness. No change in size/color of moles. No tumors, brittle nails, pitted nails, or hair loss    Breast: No tenderness, lumps, or nipple discharge      Ophthalmologic: No diplopia, photophobia, lacrimation, blurred Vision , or eye discharge    ENMT Symptoms: nasal congestion. No hearing difficulty, ear pain, tinnitus, or vertigo. No sinus symptoms, nasal   discharge, or nasal obstruction.     Respiratory and Thorax: h/o Asthma, controlled. Pt denies recent exacerbation. On prescription inhalers. No wheezing, dyspnea, cough, hemoptysis, or pleuritic chest pain     Cardiovascular: No chest pain, palpitations, dyspnea on exertion, orthopnea, paroxysmal nocturnal dyspnea,   peripheral edema, or claudication    Gastrointestinal:  Nausea. No vomiting, diarrhea, constipation, change in bowel habits, flatulence, abdominal pain, or melena    Genitourinary/ Pelvis: No hematuria, renal colic, or flank pain.  No urine discoloration, incontinence, dysuria, or urinary hesitancy. Normal urinary frequency. No nocturia, abnormal vaginal bleeding, vaginal discharge, spotting, pelvic pain, or vaginal leakage    Musculoskeletal: lower back pain. No arthralgia, arthritis, joint swelling, muscle cramping, muscle weakness, neck pain, arm pain, or leg pain    Neurological: No transient paralysis, weakness, paresthesias, or seizures. No syncope, tremors, vertigo, loss of sensation, difficulty walking, loss of consciousness, hemiparesis, confusion, or facial palsy    Psychiatric: No suicidal ideation, depression, anxiety, insomnia, memory loss, paranoia, mood swings, agitation, hallucinations, or hyperactivity    Hematology: sickle cell trait. No gum bleeding, nose bleeding, or skin lumps    Lymphatic: No enlarged or tender lymph nodes. No extremity swelling    Endocrine: No heat or cold intolerance    Immunologic: No recurrent or persistent infections

## 2023-12-22 ENCOUNTER — APPOINTMENT (OUTPATIENT)
Dept: ANTEPARTUM | Facility: CLINIC | Age: 28
End: 2023-12-22
Payer: COMMERCIAL

## 2023-12-22 ENCOUNTER — ASOB RESULT (OUTPATIENT)
Age: 28
End: 2023-12-22

## 2023-12-22 PROCEDURE — 76821 MIDDLE CEREBRAL ARTERY ECHO: CPT

## 2023-12-22 PROCEDURE — 76818 FETAL BIOPHYS PROFILE W/NST: CPT

## 2023-12-22 PROCEDURE — 76818 FETAL BIOPHYS PROFILE W/NST: CPT | Mod: 59

## 2023-12-26 ENCOUNTER — APPOINTMENT (OUTPATIENT)
Dept: ANTEPARTUM | Facility: CLINIC | Age: 28
End: 2023-12-26

## 2023-12-26 ENCOUNTER — INPATIENT (INPATIENT)
Facility: HOSPITAL | Age: 28
LOS: 2 days | Discharge: HOME | End: 2023-12-29
Attending: STUDENT IN AN ORGANIZED HEALTH CARE EDUCATION/TRAINING PROGRAM | Admitting: STUDENT IN AN ORGANIZED HEALTH CARE EDUCATION/TRAINING PROGRAM
Payer: COMMERCIAL

## 2023-12-26 VITALS — RESPIRATION RATE: 14 BRPM | TEMPERATURE: 99 F

## 2023-12-26 VITALS — HEART RATE: 107 BPM | SYSTOLIC BLOOD PRESSURE: 130 MMHG | DIASTOLIC BLOOD PRESSURE: 74 MMHG

## 2023-12-26 DIAGNOSIS — Z98.89 OTHER SPECIFIED POSTPROCEDURAL STATES: Chronic | ICD-10-CM

## 2023-12-26 LAB
ALBUMIN SERPL ELPH-MCNC: 3 G/DL — LOW (ref 3.3–5)
ALBUMIN SERPL ELPH-MCNC: 3 G/DL — LOW (ref 3.3–5)
ALBUMIN SERPL ELPH-MCNC: 3.2 G/DL — LOW (ref 3.3–5)
ALBUMIN SERPL ELPH-MCNC: 3.2 G/DL — LOW (ref 3.3–5)
ALP SERPL-CCNC: 168 U/L — HIGH (ref 40–120)
ALT FLD-CCNC: 17 U/L — SIGNIFICANT CHANGE UP (ref 4–33)
ANION GAP SERPL CALC-SCNC: 11 MMOL/L — SIGNIFICANT CHANGE UP (ref 7–14)
ANION GAP SERPL CALC-SCNC: 11 MMOL/L — SIGNIFICANT CHANGE UP (ref 7–14)
ANION GAP SERPL CALC-SCNC: 12 MMOL/L — SIGNIFICANT CHANGE UP (ref 7–14)
ANION GAP SERPL CALC-SCNC: 12 MMOL/L — SIGNIFICANT CHANGE UP (ref 7–14)
APPEARANCE UR: CLEAR — SIGNIFICANT CHANGE UP
APPEARANCE UR: CLEAR — SIGNIFICANT CHANGE UP
APTT BLD: 26.5 SEC — SIGNIFICANT CHANGE UP (ref 24.5–35.6)
APTT BLD: 26.5 SEC — SIGNIFICANT CHANGE UP (ref 24.5–35.6)
APTT BLD: 30.5 SEC — SIGNIFICANT CHANGE UP (ref 24.5–35.6)
APTT BLD: 30.5 SEC — SIGNIFICANT CHANGE UP (ref 24.5–35.6)
AST SERPL-CCNC: 10 U/L — SIGNIFICANT CHANGE UP (ref 4–32)
AST SERPL-CCNC: 10 U/L — SIGNIFICANT CHANGE UP (ref 4–32)
AST SERPL-CCNC: 9 U/L — SIGNIFICANT CHANGE UP (ref 4–32)
AST SERPL-CCNC: 9 U/L — SIGNIFICANT CHANGE UP (ref 4–32)
BASOPHILS # BLD AUTO: 0.02 K/UL — SIGNIFICANT CHANGE UP (ref 0–0.2)
BASOPHILS # BLD AUTO: 0.02 K/UL — SIGNIFICANT CHANGE UP (ref 0–0.2)
BASOPHILS # BLD AUTO: 0.03 K/UL — SIGNIFICANT CHANGE UP (ref 0–0.2)
BASOPHILS # BLD AUTO: 0.03 K/UL — SIGNIFICANT CHANGE UP (ref 0–0.2)
BASOPHILS NFR BLD AUTO: 0.2 % — SIGNIFICANT CHANGE UP (ref 0–2)
BASOPHILS NFR BLD AUTO: 0.2 % — SIGNIFICANT CHANGE UP (ref 0–2)
BASOPHILS NFR BLD AUTO: 0.4 % — SIGNIFICANT CHANGE UP (ref 0–2)
BASOPHILS NFR BLD AUTO: 0.4 % — SIGNIFICANT CHANGE UP (ref 0–2)
BILIRUB SERPL-MCNC: 0.9 MG/DL — SIGNIFICANT CHANGE UP (ref 0.2–1.2)
BILIRUB SERPL-MCNC: 0.9 MG/DL — SIGNIFICANT CHANGE UP (ref 0.2–1.2)
BILIRUB SERPL-MCNC: 1 MG/DL — SIGNIFICANT CHANGE UP (ref 0.2–1.2)
BILIRUB SERPL-MCNC: 1 MG/DL — SIGNIFICANT CHANGE UP (ref 0.2–1.2)
BILIRUB UR-MCNC: NEGATIVE — SIGNIFICANT CHANGE UP
BILIRUB UR-MCNC: NEGATIVE — SIGNIFICANT CHANGE UP
BLD GP AB SCN SERPL QL: NEGATIVE — SIGNIFICANT CHANGE UP
BLD GP AB SCN SERPL QL: NEGATIVE — SIGNIFICANT CHANGE UP
BUN SERPL-MCNC: 4 MG/DL — LOW (ref 7–23)
CALCIUM SERPL-MCNC: 8.6 MG/DL — SIGNIFICANT CHANGE UP (ref 8.4–10.5)
CALCIUM SERPL-MCNC: 8.6 MG/DL — SIGNIFICANT CHANGE UP (ref 8.4–10.5)
CALCIUM SERPL-MCNC: 8.8 MG/DL — SIGNIFICANT CHANGE UP (ref 8.4–10.5)
CALCIUM SERPL-MCNC: 8.8 MG/DL — SIGNIFICANT CHANGE UP (ref 8.4–10.5)
CHLORIDE SERPL-SCNC: 105 MMOL/L — SIGNIFICANT CHANGE UP (ref 98–107)
CHLORIDE SERPL-SCNC: 105 MMOL/L — SIGNIFICANT CHANGE UP (ref 98–107)
CHLORIDE SERPL-SCNC: 108 MMOL/L — HIGH (ref 98–107)
CHLORIDE SERPL-SCNC: 108 MMOL/L — HIGH (ref 98–107)
CO2 SERPL-SCNC: 22 MMOL/L — SIGNIFICANT CHANGE UP (ref 22–31)
CO2 SERPL-SCNC: 22 MMOL/L — SIGNIFICANT CHANGE UP (ref 22–31)
CO2 SERPL-SCNC: 23 MMOL/L — SIGNIFICANT CHANGE UP (ref 22–31)
CO2 SERPL-SCNC: 23 MMOL/L — SIGNIFICANT CHANGE UP (ref 22–31)
COLOR SPEC: YELLOW — SIGNIFICANT CHANGE UP
COLOR SPEC: YELLOW — SIGNIFICANT CHANGE UP
CREAT ?TM UR-MCNC: 37 MG/DL — SIGNIFICANT CHANGE UP
CREAT ?TM UR-MCNC: 37 MG/DL — SIGNIFICANT CHANGE UP
CREAT SERPL-MCNC: 0.52 MG/DL — SIGNIFICANT CHANGE UP (ref 0.5–1.3)
CREAT SERPL-MCNC: 0.52 MG/DL — SIGNIFICANT CHANGE UP (ref 0.5–1.3)
CREAT SERPL-MCNC: 0.59 MG/DL — SIGNIFICANT CHANGE UP (ref 0.5–1.3)
CREAT SERPL-MCNC: 0.59 MG/DL — SIGNIFICANT CHANGE UP (ref 0.5–1.3)
DIFF PNL FLD: NEGATIVE — SIGNIFICANT CHANGE UP
DIFF PNL FLD: NEGATIVE — SIGNIFICANT CHANGE UP
EGFR: 127 ML/MIN/1.73M2 — SIGNIFICANT CHANGE UP
EGFR: 127 ML/MIN/1.73M2 — SIGNIFICANT CHANGE UP
EGFR: 131 ML/MIN/1.73M2 — SIGNIFICANT CHANGE UP
EGFR: 131 ML/MIN/1.73M2 — SIGNIFICANT CHANGE UP
EOSINOPHIL # BLD AUTO: 0.01 K/UL — SIGNIFICANT CHANGE UP (ref 0–0.5)
EOSINOPHIL # BLD AUTO: 0.01 K/UL — SIGNIFICANT CHANGE UP (ref 0–0.5)
EOSINOPHIL # BLD AUTO: 0.06 K/UL — SIGNIFICANT CHANGE UP (ref 0–0.5)
EOSINOPHIL # BLD AUTO: 0.06 K/UL — SIGNIFICANT CHANGE UP (ref 0–0.5)
EOSINOPHIL NFR BLD AUTO: 0.1 % — SIGNIFICANT CHANGE UP (ref 0–6)
EOSINOPHIL NFR BLD AUTO: 0.1 % — SIGNIFICANT CHANGE UP (ref 0–6)
EOSINOPHIL NFR BLD AUTO: 0.7 % — SIGNIFICANT CHANGE UP (ref 0–6)
EOSINOPHIL NFR BLD AUTO: 0.7 % — SIGNIFICANT CHANGE UP (ref 0–6)
FIBRINOGEN PPP-MCNC: 402 MG/DL — SIGNIFICANT CHANGE UP (ref 200–465)
FIBRINOGEN PPP-MCNC: 402 MG/DL — SIGNIFICANT CHANGE UP (ref 200–465)
FIBRINOGEN PPP-MCNC: 489 MG/DL — HIGH (ref 200–465)
FIBRINOGEN PPP-MCNC: 489 MG/DL — HIGH (ref 200–465)
GLUCOSE SERPL-MCNC: 74 MG/DL — SIGNIFICANT CHANGE UP (ref 70–99)
GLUCOSE SERPL-MCNC: 74 MG/DL — SIGNIFICANT CHANGE UP (ref 70–99)
GLUCOSE SERPL-MCNC: 93 MG/DL — SIGNIFICANT CHANGE UP (ref 70–99)
GLUCOSE SERPL-MCNC: 93 MG/DL — SIGNIFICANT CHANGE UP (ref 70–99)
GLUCOSE UR QL: NEGATIVE MG/DL — SIGNIFICANT CHANGE UP
GLUCOSE UR QL: NEGATIVE MG/DL — SIGNIFICANT CHANGE UP
HBV SURFACE AG SERPL QL IA: SIGNIFICANT CHANGE UP
HBV SURFACE AG SERPL QL IA: SIGNIFICANT CHANGE UP
HCT VFR BLD CALC: 35.2 % — SIGNIFICANT CHANGE UP (ref 34.5–45)
HCT VFR BLD CALC: 35.2 % — SIGNIFICANT CHANGE UP (ref 34.5–45)
HCT VFR BLD CALC: 37.3 % — SIGNIFICANT CHANGE UP (ref 34.5–45)
HCT VFR BLD CALC: 37.3 % — SIGNIFICANT CHANGE UP (ref 34.5–45)
HGB BLD-MCNC: 11.8 G/DL — SIGNIFICANT CHANGE UP (ref 11.5–15.5)
HGB BLD-MCNC: 11.8 G/DL — SIGNIFICANT CHANGE UP (ref 11.5–15.5)
HGB BLD-MCNC: 12.3 G/DL — SIGNIFICANT CHANGE UP (ref 11.5–15.5)
HGB BLD-MCNC: 12.3 G/DL — SIGNIFICANT CHANGE UP (ref 11.5–15.5)
HIV 1+2 AB+HIV1 P24 AG SERPL QL IA: SIGNIFICANT CHANGE UP
HIV 1+2 AB+HIV1 P24 AG SERPL QL IA: SIGNIFICANT CHANGE UP
IANC: 10.66 K/UL — HIGH (ref 1.8–7.4)
IANC: 10.66 K/UL — HIGH (ref 1.8–7.4)
IANC: 5.64 K/UL — SIGNIFICANT CHANGE UP (ref 1.8–7.4)
IANC: 5.64 K/UL — SIGNIFICANT CHANGE UP (ref 1.8–7.4)
IMM GRANULOCYTES NFR BLD AUTO: 0.8 % — SIGNIFICANT CHANGE UP (ref 0–0.9)
IMM GRANULOCYTES NFR BLD AUTO: 0.8 % — SIGNIFICANT CHANGE UP (ref 0–0.9)
IMM GRANULOCYTES NFR BLD AUTO: 1.2 % — HIGH (ref 0–0.9)
IMM GRANULOCYTES NFR BLD AUTO: 1.2 % — HIGH (ref 0–0.9)
INR BLD: 0.93 RATIO — SIGNIFICANT CHANGE UP (ref 0.85–1.18)
INR BLD: 0.93 RATIO — SIGNIFICANT CHANGE UP (ref 0.85–1.18)
INR BLD: 0.94 RATIO — SIGNIFICANT CHANGE UP (ref 0.85–1.18)
INR BLD: 0.94 RATIO — SIGNIFICANT CHANGE UP (ref 0.85–1.18)
KETONES UR-MCNC: NEGATIVE MG/DL — SIGNIFICANT CHANGE UP
KETONES UR-MCNC: NEGATIVE MG/DL — SIGNIFICANT CHANGE UP
LDH SERPL L TO P-CCNC: 220 U/L — SIGNIFICANT CHANGE UP (ref 135–225)
LDH SERPL L TO P-CCNC: 220 U/L — SIGNIFICANT CHANGE UP (ref 135–225)
LDH SERPL L TO P-CCNC: 311 U/L — HIGH (ref 135–225)
LDH SERPL L TO P-CCNC: 311 U/L — HIGH (ref 135–225)
LEUKOCYTE ESTERASE UR-ACNC: NEGATIVE — SIGNIFICANT CHANGE UP
LEUKOCYTE ESTERASE UR-ACNC: NEGATIVE — SIGNIFICANT CHANGE UP
LYMPHOCYTES # BLD AUTO: 1.53 K/UL — SIGNIFICANT CHANGE UP (ref 1–3.3)
LYMPHOCYTES # BLD AUTO: 1.53 K/UL — SIGNIFICANT CHANGE UP (ref 1–3.3)
LYMPHOCYTES # BLD AUTO: 1.54 K/UL — SIGNIFICANT CHANGE UP (ref 1–3.3)
LYMPHOCYTES # BLD AUTO: 1.54 K/UL — SIGNIFICANT CHANGE UP (ref 1–3.3)
LYMPHOCYTES # BLD AUTO: 12 % — LOW (ref 13–44)
LYMPHOCYTES # BLD AUTO: 12 % — LOW (ref 13–44)
LYMPHOCYTES # BLD AUTO: 18.6 % — SIGNIFICANT CHANGE UP (ref 13–44)
LYMPHOCYTES # BLD AUTO: 18.6 % — SIGNIFICANT CHANGE UP (ref 13–44)
MAGNESIUM SERPL-MCNC: 3.7 MG/DL — HIGH (ref 1.6–2.6)
MAGNESIUM SERPL-MCNC: 3.7 MG/DL — HIGH (ref 1.6–2.6)
MCHC RBC-ENTMCNC: 30.4 PG — SIGNIFICANT CHANGE UP (ref 27–34)
MCHC RBC-ENTMCNC: 30.4 PG — SIGNIFICANT CHANGE UP (ref 27–34)
MCHC RBC-ENTMCNC: 30.5 PG — SIGNIFICANT CHANGE UP (ref 27–34)
MCHC RBC-ENTMCNC: 30.5 PG — SIGNIFICANT CHANGE UP (ref 27–34)
MCHC RBC-ENTMCNC: 33 GM/DL — SIGNIFICANT CHANGE UP (ref 32–36)
MCHC RBC-ENTMCNC: 33 GM/DL — SIGNIFICANT CHANGE UP (ref 32–36)
MCHC RBC-ENTMCNC: 33.5 GM/DL — SIGNIFICANT CHANGE UP (ref 32–36)
MCHC RBC-ENTMCNC: 33.5 GM/DL — SIGNIFICANT CHANGE UP (ref 32–36)
MCV RBC AUTO: 91 FL — SIGNIFICANT CHANGE UP (ref 80–100)
MCV RBC AUTO: 91 FL — SIGNIFICANT CHANGE UP (ref 80–100)
MCV RBC AUTO: 92.3 FL — SIGNIFICANT CHANGE UP (ref 80–100)
MCV RBC AUTO: 92.3 FL — SIGNIFICANT CHANGE UP (ref 80–100)
MONOCYTES # BLD AUTO: 0.43 K/UL — SIGNIFICANT CHANGE UP (ref 0–0.9)
MONOCYTES # BLD AUTO: 0.43 K/UL — SIGNIFICANT CHANGE UP (ref 0–0.9)
MONOCYTES # BLD AUTO: 0.92 K/UL — HIGH (ref 0–0.9)
MONOCYTES # BLD AUTO: 0.92 K/UL — HIGH (ref 0–0.9)
MONOCYTES NFR BLD AUTO: 11.1 % — SIGNIFICANT CHANGE UP (ref 2–14)
MONOCYTES NFR BLD AUTO: 11.1 % — SIGNIFICANT CHANGE UP (ref 2–14)
MONOCYTES NFR BLD AUTO: 3.4 % — SIGNIFICANT CHANGE UP (ref 2–14)
MONOCYTES NFR BLD AUTO: 3.4 % — SIGNIFICANT CHANGE UP (ref 2–14)
NEUTROPHILS # BLD AUTO: 10.66 K/UL — HIGH (ref 1.8–7.4)
NEUTROPHILS # BLD AUTO: 10.66 K/UL — HIGH (ref 1.8–7.4)
NEUTROPHILS # BLD AUTO: 5.64 K/UL — SIGNIFICANT CHANGE UP (ref 1.8–7.4)
NEUTROPHILS # BLD AUTO: 5.64 K/UL — SIGNIFICANT CHANGE UP (ref 1.8–7.4)
NEUTROPHILS NFR BLD AUTO: 68 % — SIGNIFICANT CHANGE UP (ref 43–77)
NEUTROPHILS NFR BLD AUTO: 68 % — SIGNIFICANT CHANGE UP (ref 43–77)
NEUTROPHILS NFR BLD AUTO: 83.5 % — HIGH (ref 43–77)
NEUTROPHILS NFR BLD AUTO: 83.5 % — HIGH (ref 43–77)
NITRITE UR-MCNC: NEGATIVE — SIGNIFICANT CHANGE UP
NITRITE UR-MCNC: NEGATIVE — SIGNIFICANT CHANGE UP
NRBC # BLD: 0 /100 WBCS — SIGNIFICANT CHANGE UP (ref 0–0)
NRBC # FLD: 0.02 K/UL — HIGH (ref 0–0)
PH UR: 6.5 — SIGNIFICANT CHANGE UP (ref 5–8)
PH UR: 6.5 — SIGNIFICANT CHANGE UP (ref 5–8)
PLATELET # BLD AUTO: 221 K/UL — SIGNIFICANT CHANGE UP (ref 150–400)
PLATELET # BLD AUTO: 221 K/UL — SIGNIFICANT CHANGE UP (ref 150–400)
PLATELET # BLD AUTO: 236 K/UL — SIGNIFICANT CHANGE UP (ref 150–400)
PLATELET # BLD AUTO: 236 K/UL — SIGNIFICANT CHANGE UP (ref 150–400)
POTASSIUM SERPL-MCNC: 3.4 MMOL/L — LOW (ref 3.5–5.3)
POTASSIUM SERPL-MCNC: 3.4 MMOL/L — LOW (ref 3.5–5.3)
POTASSIUM SERPL-MCNC: 4.4 MMOL/L — SIGNIFICANT CHANGE UP (ref 3.5–5.3)
POTASSIUM SERPL-MCNC: 4.4 MMOL/L — SIGNIFICANT CHANGE UP (ref 3.5–5.3)
POTASSIUM SERPL-SCNC: 3.4 MMOL/L — LOW (ref 3.5–5.3)
POTASSIUM SERPL-SCNC: 3.4 MMOL/L — LOW (ref 3.5–5.3)
POTASSIUM SERPL-SCNC: 4.4 MMOL/L — SIGNIFICANT CHANGE UP (ref 3.5–5.3)
POTASSIUM SERPL-SCNC: 4.4 MMOL/L — SIGNIFICANT CHANGE UP (ref 3.5–5.3)
PROT ?TM UR-MCNC: 11 MG/DL — SIGNIFICANT CHANGE UP
PROT ?TM UR-MCNC: 11 MG/DL — SIGNIFICANT CHANGE UP
PROT SERPL-MCNC: 6.3 G/DL — SIGNIFICANT CHANGE UP (ref 6–8.3)
PROT SERPL-MCNC: 6.3 G/DL — SIGNIFICANT CHANGE UP (ref 6–8.3)
PROT SERPL-MCNC: 6.4 G/DL — SIGNIFICANT CHANGE UP (ref 6–8.3)
PROT SERPL-MCNC: 6.4 G/DL — SIGNIFICANT CHANGE UP (ref 6–8.3)
PROT UR-MCNC: NEGATIVE MG/DL — SIGNIFICANT CHANGE UP
PROT UR-MCNC: NEGATIVE MG/DL — SIGNIFICANT CHANGE UP
PROT/CREAT UR-RTO: 0.3 RATIO — HIGH (ref 0–0.2)
PROT/CREAT UR-RTO: 0.3 RATIO — HIGH (ref 0–0.2)
PROTHROM AB SERPL-ACNC: 10.4 SEC — SIGNIFICANT CHANGE UP (ref 9.5–13)
PROTHROM AB SERPL-ACNC: 10.4 SEC — SIGNIFICANT CHANGE UP (ref 9.5–13)
PROTHROM AB SERPL-ACNC: 10.6 SEC — SIGNIFICANT CHANGE UP (ref 9.5–13)
PROTHROM AB SERPL-ACNC: 10.6 SEC — SIGNIFICANT CHANGE UP (ref 9.5–13)
RBC # BLD: 3.87 M/UL — SIGNIFICANT CHANGE UP (ref 3.8–5.2)
RBC # BLD: 3.87 M/UL — SIGNIFICANT CHANGE UP (ref 3.8–5.2)
RBC # BLD: 4.04 M/UL — SIGNIFICANT CHANGE UP (ref 3.8–5.2)
RBC # BLD: 4.04 M/UL — SIGNIFICANT CHANGE UP (ref 3.8–5.2)
RBC # FLD: 13.2 % — SIGNIFICANT CHANGE UP (ref 10.3–14.5)
RBC # FLD: 13.2 % — SIGNIFICANT CHANGE UP (ref 10.3–14.5)
RBC # FLD: 13.4 % — SIGNIFICANT CHANGE UP (ref 10.3–14.5)
RBC # FLD: 13.4 % — SIGNIFICANT CHANGE UP (ref 10.3–14.5)
RH IG SCN BLD-IMP: POSITIVE — SIGNIFICANT CHANGE UP
RH IG SCN BLD-IMP: POSITIVE — SIGNIFICANT CHANGE UP
RUBV IGG SER-ACNC: 7.4 INDEX — SIGNIFICANT CHANGE UP
RUBV IGG SER-ACNC: 7.4 INDEX — SIGNIFICANT CHANGE UP
RUBV IGG SER-IMP: POSITIVE — SIGNIFICANT CHANGE UP
RUBV IGG SER-IMP: POSITIVE — SIGNIFICANT CHANGE UP
SODIUM SERPL-SCNC: 139 MMOL/L — SIGNIFICANT CHANGE UP (ref 135–145)
SODIUM SERPL-SCNC: 139 MMOL/L — SIGNIFICANT CHANGE UP (ref 135–145)
SODIUM SERPL-SCNC: 142 MMOL/L — SIGNIFICANT CHANGE UP (ref 135–145)
SODIUM SERPL-SCNC: 142 MMOL/L — SIGNIFICANT CHANGE UP (ref 135–145)
SP GR SPEC: 1.01 — SIGNIFICANT CHANGE UP (ref 1–1.03)
SP GR SPEC: 1.01 — SIGNIFICANT CHANGE UP (ref 1–1.03)
T PALLIDUM AB TITR SER: NEGATIVE — SIGNIFICANT CHANGE UP
T PALLIDUM AB TITR SER: NEGATIVE — SIGNIFICANT CHANGE UP
URATE SERPL-MCNC: 4.3 MG/DL — SIGNIFICANT CHANGE UP (ref 2.5–7)
UROBILINOGEN FLD QL: 0.2 MG/DL — SIGNIFICANT CHANGE UP (ref 0.2–1)
UROBILINOGEN FLD QL: 0.2 MG/DL — SIGNIFICANT CHANGE UP (ref 0.2–1)
WBC # BLD: 12.75 K/UL — HIGH (ref 3.8–10.5)
WBC # BLD: 12.75 K/UL — HIGH (ref 3.8–10.5)
WBC # BLD: 8.29 K/UL — SIGNIFICANT CHANGE UP (ref 3.8–10.5)
WBC # BLD: 8.29 K/UL — SIGNIFICANT CHANGE UP (ref 3.8–10.5)
WBC # FLD AUTO: 12.75 K/UL — HIGH (ref 3.8–10.5)
WBC # FLD AUTO: 12.75 K/UL — HIGH (ref 3.8–10.5)
WBC # FLD AUTO: 8.29 K/UL — SIGNIFICANT CHANGE UP (ref 3.8–10.5)
WBC # FLD AUTO: 8.29 K/UL — SIGNIFICANT CHANGE UP (ref 3.8–10.5)

## 2023-12-26 PROCEDURE — 88307 TISSUE EXAM BY PATHOLOGIST: CPT | Mod: 26

## 2023-12-26 DEVICE — INTERCEED 3 X 4": Type: IMPLANTABLE DEVICE | Status: FUNCTIONAL

## 2023-12-26 RX ORDER — ACETAMINOPHEN 500 MG
975 TABLET ORAL
Refills: 0 | Status: DISCONTINUED | OUTPATIENT
Start: 2023-12-26 | End: 2023-12-29

## 2023-12-26 RX ORDER — DIPHENHYDRAMINE HCL 50 MG
25 CAPSULE ORAL EVERY 6 HOURS
Refills: 0 | Status: DISCONTINUED | OUTPATIENT
Start: 2023-12-26 | End: 2023-12-29

## 2023-12-26 RX ORDER — FERROUS SULFATE 325(65) MG
325 TABLET ORAL DAILY
Refills: 0 | Status: DISCONTINUED | OUTPATIENT
Start: 2023-12-26 | End: 2023-12-29

## 2023-12-26 RX ORDER — OXYCODONE HYDROCHLORIDE 5 MG/1
5 TABLET ORAL
Refills: 0 | Status: DISCONTINUED | OUTPATIENT
Start: 2023-12-26 | End: 2023-12-26

## 2023-12-26 RX ORDER — OXYCODONE HYDROCHLORIDE 5 MG/1
5 TABLET ORAL ONCE
Refills: 0 | Status: DISCONTINUED | OUTPATIENT
Start: 2023-12-26 | End: 2023-12-29

## 2023-12-26 RX ORDER — MAGNESIUM SULFATE 500 MG/ML
2 VIAL (ML) INJECTION
Qty: 40 | Refills: 0 | Status: DISCONTINUED | OUTPATIENT
Start: 2023-12-26 | End: 2023-12-27

## 2023-12-26 RX ORDER — ALBUTEROL 90 UG/1
2 AEROSOL, METERED ORAL EVERY 6 HOURS
Refills: 0 | Status: DISCONTINUED | OUTPATIENT
Start: 2023-12-27 | End: 2023-12-29

## 2023-12-26 RX ORDER — INFLUENZA VIRUS VACCINE 15; 15; 15; 15 UG/.5ML; UG/.5ML; UG/.5ML; UG/.5ML
0.5 SUSPENSION INTRAMUSCULAR ONCE
Refills: 0 | Status: DISCONTINUED | OUTPATIENT
Start: 2023-12-26 | End: 2023-12-29

## 2023-12-26 RX ORDER — NIFEDIPINE 30 MG
10 TABLET, EXTENDED RELEASE 24 HR ORAL ONCE
Refills: 0 | Status: COMPLETED | OUTPATIENT
Start: 2023-12-26 | End: 2023-12-26

## 2023-12-26 RX ORDER — GENTAMICIN SULFATE 40 MG/ML
400 VIAL (ML) INJECTION ONCE
Refills: 0 | Status: DISCONTINUED | OUTPATIENT
Start: 2023-12-26 | End: 2023-12-26

## 2023-12-26 RX ORDER — OXYCODONE HYDROCHLORIDE 5 MG/1
5 TABLET ORAL
Refills: 0 | Status: DISCONTINUED | OUTPATIENT
Start: 2023-12-26 | End: 2023-12-29

## 2023-12-26 RX ORDER — NALOXONE HYDROCHLORIDE 4 MG/.1ML
0.1 SPRAY NASAL
Refills: 0 | Status: DISCONTINUED | OUTPATIENT
Start: 2023-12-26 | End: 2023-12-28

## 2023-12-26 RX ORDER — DEXAMETHASONE 0.5 MG/5ML
4 ELIXIR ORAL EVERY 6 HOURS
Refills: 0 | Status: DISCONTINUED | OUTPATIENT
Start: 2023-12-26 | End: 2023-12-28

## 2023-12-26 RX ORDER — MAGNESIUM HYDROXIDE 400 MG/1
30 TABLET, CHEWABLE ORAL
Refills: 0 | Status: DISCONTINUED | OUTPATIENT
Start: 2023-12-26 | End: 2023-12-29

## 2023-12-26 RX ORDER — OXYCODONE HYDROCHLORIDE 5 MG/1
10 TABLET ORAL
Refills: 0 | Status: DISCONTINUED | OUTPATIENT
Start: 2023-12-26 | End: 2023-12-26

## 2023-12-26 RX ORDER — TETANUS TOXOID, REDUCED DIPHTHERIA TOXOID AND ACELLULAR PERTUSSIS VACCINE, ADSORBED 5; 2.5; 8; 8; 2.5 [IU]/.5ML; [IU]/.5ML; UG/.5ML; UG/.5ML; UG/.5ML
0.5 SUSPENSION INTRAMUSCULAR ONCE
Refills: 0 | Status: DISCONTINUED | OUTPATIENT
Start: 2023-12-26 | End: 2023-12-29

## 2023-12-26 RX ORDER — CITRIC ACID/SODIUM CITRATE 300-500 MG
30 SOLUTION, ORAL ORAL ONCE
Refills: 0 | Status: COMPLETED | OUTPATIENT
Start: 2023-12-26 | End: 2023-12-26

## 2023-12-26 RX ORDER — OXYTOCIN 10 UNIT/ML
16.67 VIAL (ML) INJECTION
Qty: 20 | Refills: 0 | Status: DISCONTINUED | OUTPATIENT
Start: 2023-12-26 | End: 2023-12-27

## 2023-12-26 RX ORDER — OXYTOCIN 10 UNIT/ML
333.33 VIAL (ML) INJECTION
Qty: 20 | Refills: 0 | Status: DISCONTINUED | OUTPATIENT
Start: 2023-12-26 | End: 2023-12-26

## 2023-12-26 RX ORDER — ONDANSETRON 8 MG/1
4 TABLET, FILM COATED ORAL EVERY 6 HOURS
Refills: 0 | Status: DISCONTINUED | OUTPATIENT
Start: 2023-12-26 | End: 2023-12-28

## 2023-12-26 RX ORDER — FAMOTIDINE 10 MG/ML
20 INJECTION INTRAVENOUS ONCE
Refills: 0 | Status: COMPLETED | OUTPATIENT
Start: 2023-12-26 | End: 2023-12-26

## 2023-12-26 RX ORDER — MORPHINE SULFATE 50 MG/1
0.15 CAPSULE, EXTENDED RELEASE ORAL ONCE
Refills: 0 | Status: DISCONTINUED | OUTPATIENT
Start: 2023-12-26 | End: 2023-12-27

## 2023-12-26 RX ORDER — NALBUPHINE HYDROCHLORIDE 10 MG/ML
2.5 INJECTION, SOLUTION INTRAMUSCULAR; INTRAVENOUS; SUBCUTANEOUS EVERY 6 HOURS
Refills: 0 | Status: DISCONTINUED | OUTPATIENT
Start: 2023-12-26 | End: 2023-12-28

## 2023-12-26 RX ORDER — SIMETHICONE 80 MG/1
80 TABLET, CHEWABLE ORAL EVERY 4 HOURS
Refills: 0 | Status: DISCONTINUED | OUTPATIENT
Start: 2023-12-26 | End: 2023-12-29

## 2023-12-26 RX ORDER — HEPARIN SODIUM 5000 [USP'U]/ML
5000 INJECTION INTRAVENOUS; SUBCUTANEOUS EVERY 12 HOURS
Refills: 0 | Status: DISCONTINUED | OUTPATIENT
Start: 2023-12-26 | End: 2023-12-29

## 2023-12-26 RX ORDER — IBUPROFEN 200 MG
600 TABLET ORAL EVERY 6 HOURS
Refills: 0 | Status: COMPLETED | OUTPATIENT
Start: 2023-12-26 | End: 2024-11-23

## 2023-12-26 RX ORDER — KETOROLAC TROMETHAMINE 30 MG/ML
30 SYRINGE (ML) INJECTION EVERY 6 HOURS
Refills: 0 | Status: DISCONTINUED | OUTPATIENT
Start: 2023-12-26 | End: 2023-12-27

## 2023-12-26 RX ORDER — MAGNESIUM SULFATE 500 MG/ML
4 VIAL (ML) INJECTION ONCE
Refills: 0 | Status: COMPLETED | OUTPATIENT
Start: 2023-12-26 | End: 2023-12-26

## 2023-12-26 RX ORDER — LANOLIN
1 OINTMENT (GRAM) TOPICAL EVERY 6 HOURS
Refills: 0 | Status: DISCONTINUED | OUTPATIENT
Start: 2023-12-26 | End: 2023-12-29

## 2023-12-26 RX ADMIN — Medication 50 GM/HR: at 22:07

## 2023-12-26 RX ADMIN — Medication 50 MILLIUNIT(S)/MIN: at 19:37

## 2023-12-26 RX ADMIN — Medication 30 MILLIGRAM(S): at 23:42

## 2023-12-26 RX ADMIN — SODIUM CHLORIDE 125 MILLILITER(S): 9 INJECTION, SOLUTION INTRAVENOUS at 07:44

## 2023-12-26 RX ADMIN — Medication 10 MILLIGRAM(S): at 16:26

## 2023-12-26 RX ADMIN — Medication 50 GM/HR: at 19:36

## 2023-12-26 RX ADMIN — Medication 30 MILLILITER(S): at 07:44

## 2023-12-26 RX ADMIN — Medication 50 GM/HR: at 16:49

## 2023-12-26 RX ADMIN — Medication 300 GRAM(S): at 16:16

## 2023-12-26 RX ADMIN — HEPARIN SODIUM 5000 UNIT(S): 5000 INJECTION INTRAVENOUS; SUBCUTANEOUS at 21:29

## 2023-12-26 RX ADMIN — FAMOTIDINE 20 MILLIGRAM(S): 10 INJECTION INTRAVENOUS at 07:44

## 2023-12-26 NOTE — OB PROVIDER H&P - HISTORY OF PRESENT ILLNESS
29 y/o female presents to [facility] preop for primary  section due to breech presentations and monochorionic/ diamniotic twins.      R2 Admission H&P    Subjective  HPI: 28y  @ 36w2d presents for  for twins.  +FM. -LOF. -CTXs. -VB. Pt denies any other concerns.    – PNC: gHTN. GBS unk.  – OBHx:  - TOP x1  – GynHx: denies fibroids, cysts, endometriosis, abnormal pap smears, STIs  – PMH: asthma (using inhaler daily, per pulm rec)  – PSH: bilateral eye surgery ()  – Psych: denies   – Social: denies   – Meds: PNV, Albuterol, ASA  – Allergies: PCN (childhood allergy)  – Will accept blood transfusions? Yes 27 y/o female presents to [facility] preop for primary  section due to breech presentations and monochorionic/ diamniotic twins.      R2 Admission H&P    Subjective  HPI: 28y  @ 36w2d presents for  for twins.  +FM. -LOF. -CTXs. -VB. Pt denies any other concerns.    – PNC: gHTN. GBS unk.  – OBHx:  - TOP x1  – GynHx: denies fibroids, cysts, endometriosis, abnormal pap smears, STIs  – PMH: asthma (using inhaler daily, per pulm rec)  – PSH: bilateral eye surgery ()  – Psych: denies   – Social: denies   – Meds: PNV, Albuterol, ASA  – Allergies: PCN (childhood allergy)  – Will accept blood transfusions? Yes

## 2023-12-26 NOTE — OB RN DELIVERY SUMMARY - NS_GENERALBABYACOMMENTA_OBGYN_ALL_OB_FT
mother had QBL 1182 in OR  and elevated BP in PACU , was cold and shaking , started on magnesium sulfate infusion . Baby A had respiratory difficulty peds transferred baby to NICU

## 2023-12-26 NOTE — OB RN PATIENT PROFILE - FALL HARM RISK - UNIVERSAL INTERVENTIONS
Bed in lowest position, wheels locked, appropriate side rails in place/Call bell, personal items and telephone in reach/Instruct patient to call for assistance before getting out of bed or chair/Non-slip footwear when patient is out of bed/[place] to call system/Physically safe environment - no spills, clutter or unnecessary equipment/Purposeful Proactive Rounding/Room/bathroom lighting operational, light cord in reach Bed in lowest position, wheels locked, appropriate side rails in place/Call bell, personal items and telephone in reach/Instruct patient to call for assistance before getting out of bed or chair/Non-slip footwear when patient is out of bed/[place] to call system/Physically safe environment - no spills, clutter or unnecessary equipment/Purposeful Proactive Rounding/Room/bathroom lighting operational, light cord in reach

## 2023-12-26 NOTE — OB PROVIDER DELIVERY SUMMARY - NSSELHIDDEN_OBGYN_ALL_OB_FT
[NS_DeliveryAttending1_OBGYN_ALL_OB_FT:[id number]],[NS_DeliveryAssist1_OBGYN_ALL_OB_FT:[id number]],[NS_DeliveryRN_OBGYN_ALL_OB_FT:[id number]] [NS_DeliveryAttending1_OBGYN_ALL_OB_FT:[id number]],[NS_DeliveryAssist1_OBGYN_ALL_OB_FT:[id number]],[NS_DeliveryRN_OBGYN_ALL_OB_FT:[id number]]

## 2023-12-26 NOTE — OB RN DELIVERY SUMMARY - BABY B: APGAR 1 MIN RESP RATE, DELIVERY
[Structural Heart and Valve Disease] : structural heart and valve disease [Follow-Up - Clinic] : a clinic follow-up of [Abnormal ECG] : an abnormal ECG [Hyperlipidemia] : hyperlipidemia [Hypertension] : hypertension (2) good, crying

## 2023-12-26 NOTE — OB PROVIDER DELIVERY SUMMARY - NSPROVIDERDELIVERYNOTE_OBGYN_ALL_OB_FT
primary LTCS, uncomplicated  viable TIUP, female/female infant, breech/transverse presentation,   Baby A: 2640g (5#13); 2/8  Baby B: 2680g (5#15); 7/9  Cord gasses sent  Grossly normal fallopian tubes, uterus, and ovaries    QBL: 1182  IVF: 2500  UOP: 700    Dictation by Dr. Carlos Spears, PGY-2

## 2023-12-26 NOTE — OB PROVIDER H&P - NSHPPHYSICALEXAM_GEN_ALL_CORE
Objective  – VS  T(C): 37.4 (12-26-23 @ 07:20)  HR: 107 (12-26-23 @ 07:20)  BP: 130/74 (12-26-23 @ 07:20)  RR: 14 (12-26-23 @ 07:20)  SpO2: --    Physical Exam  CV: RRR  Pulm: breathing comfortably on RA  Abd: gravid, nontender  Extr: moving all extremities with ease

## 2023-12-26 NOTE — OB RN INTRAOPERATIVE NOTE - NSSELHIDDEN_OBGYN_ALL_OB_FT
[NS_DeliveryAttending1_OBGYN_ALL_OB_FT:[id number]],[NS_DeliveryAssist1_OBGYN_ALL_OB_FT:[id number]],[NS_DeliveryRN_OBGYN_ALL_OB_FT:[id number]] [NS_DeliveryAttending1_OBGYN_ALL_OB_FT:[id number]],[NS_DeliveryAssist1_OBGYN_ALL_OB_FT:[id number]],[NS_DeliveryRN_OBGYN_ALL_OB_FT:[id number]]

## 2023-12-26 NOTE — PROVIDER CONTACT NOTE (OTHER) - ASSESSMENT
pt awake , alert s/p c/s for twins , is shivering but afebrile, Denies Headache, epigastric pain or visual changes. See flow sheet for BP

## 2023-12-26 NOTE — OB RN DELIVERY SUMMARY - NS_SEPSISRSKCALC_OBGYN_ALL_OB_FT
EOS calculated successfully. EOS Risk Factor: 0.5/1000 live births ([facility] national incidence); GA=36w2d; Temp=99.32; ROM=0.05; GBS='Unknown'; Antibiotics='No antibiotics or any antibiotics < 2 hrs prior to birth'   EOS calculated successfully. EOS Risk Factor: 0.5/1000 live births ([facility] national incidence); GA=36w2d; Temp=99.32; ROM=0.05; GBS='Unknown'; Antibiotics='No antibiotics or any antibiotics < 2 hrs prior to birth'

## 2023-12-26 NOTE — OB PROVIDER H&P - ASSESSMENT
Assessment  28y   @ 36w2d presents for scheduled c/section for twins.     -Admit to labor and delivery  -Continuous monitoring  -Consents to be signed by attending    -Anesthesia consult  -Preop blood work  -Preop medications     Karena Spears MD  PGY2 Assessment  28y   @ 36w2d presents for scheduled c/section for twins.     -Admit to labor and delivery  -Continuous monitoring  -Consents to be signed by attending    -Anesthesia consult  -Preop blood work  -Preop medications     Karena Spears MD  PGY2    Agree with the above findings. 29yo  @ 36w2d with mono/di twin pregnancy. Reactive NST x2. Irregular ctxs. VSS. labs wnl. Plan for PCS. Risk and benefits discussed with pt. pt voiced understanding and agrees with plan. Plan for RCS. Consent signed.  Assessment  28y   @ 36w2d presents for scheduled c/section for twins.     -Admit to labor and delivery  -Continuous monitoring  -Consents to be signed by attending    -Anesthesia consult  -Preop blood work  -Preop medications     Karena Spears MD  PGY2    Agree with the above findings. 27yo  @ 36w2d with mono/di twin pregnancy. Reactive NST x2. Irregular ctxs. VSS. labs wnl. Plan for PCS. Risk and benefits discussed with pt. pt voiced understanding and agrees with plan. Plan for RCS. Consent signed.

## 2023-12-26 NOTE — OB RN PATIENT PROFILE - FLU SEASON?
Discussed with Ortho AP and primary service and would like to avoid topical agents near surgical site. Will switch then to oral fluconazole 200mg Daily. Clotrimazole discontinued. CMP ordered for tomorrow and EKG reviewed and QTC was unremarkable recently.  Plan will be for 7 day total. Will monitor for progress on exam.
Yes...

## 2023-12-26 NOTE — OB RN PATIENT PROFILE - FUNCTIONAL ASSESSMENT - BASIC MOBILITY 6.
4-calculated by average/Not able to assess (calculate score using [facility] averaging method)  4-calculated by average/Not able to assess (calculate score using [facility] averaging method)

## 2023-12-26 NOTE — OB NEONATOLOGY/PEDIATRICIAN DELIVERY SUMMARY - NSPEDSNEONOTESA_OBGYN_ALL_OB_FT
Baby is a 36.2 wk GA F mono-di twin born to a 27 y/o  mother via C/S. Maternal history +gHTN not requiring meds, asthma, sickle cell trait. Prenatal history neg. Maternal BT A+. PNL neg, NR, and immune. GBS unknown. ROM at C/S, clear fluids. Apgars 2/8. Mom plans to breastfeed and bottle feed, would like hepB. Pt emerged with poor tone and poor respiratory effort. PPV PEEP 5/ PIP 20 started at 1.5MOL for bradycardia and poor respiratory effort. PPV continued until 5MOL, CPAP 5 initiated at 5MOL until 10MOL with improvement of color and respiratory effort. NICU called to delivery at initiation of PPV. Dr. Ivy present for resuscitation. Pt stooled x2 in DR.     Gen: poor resp effort, hypotonic, cyanotic  HEENT: NC/AT; AFOF; ears and nose clinically patent, normally set; no tags ; no cleft lip/palate, oropharynx clear  Skin: cyanotic, no rash  Resp: poor resp effort  Cardiac: bradycardia < 100 bpm  Abd: soft, NT/ND; no HSM, no masses palpated; umbilicus c/d/I  Back: spine straight, no dimples or liu  Extremities: FROM; no crepitus; negative O/B; R hip held in external rotation  : Avila I; +hymen skin tag; no hernia; anus patent  Neuro: hypotonic; + [place], suck, grasp, Babinski Baby is a 36.2 wk GA F mono-di twin born to a 27 y/o  mother via C/S. Maternal history +gHTN not requiring meds, asthma, sickle cell trait. Prenatal history neg. Maternal BT A+. PNL neg, NR, and immune. GBS unknown. ROM at C/S, clear fluids. Apgars 2/8. Mom plans to breastfeed and bottle feed, would like hepB. Pt emerged with poor tone and poor respiratory effort. PPV PEEP 5/ PIP 20 started at 1.5MOL for bradycardia and poor respiratory effort. PPV continued until 5MOL, CPAP 5 initiated at 5MOL until 10MOL with improvement of color and respiratory effort. NICU called to delivery at initiation of PPV. Dr. Ivy present for resuscitation. Pt stooled x2 in DR.     Gen: poor resp effort, hypotonic, cyanotic  HEENT: NC/AT; AFOF; ears and nose clinically patent, normally set; no tags ; no cleft lip/palate, oropharynx clear  Skin: cyanotic, no rash  Resp: poor resp effort  Cardiac: bradycardia < 100 bpm  Abd: soft, NT/ND; no HSM, no masses palpated; umbilicus c/d/I  Back: spine straight, no dimples or liu  Extremities: FROM; no crepitus; negative O/B; R hip held in external rotation  : Avila I; +hymen skin tag; no hernia; anus patent  Neuro: hypotonic; + [place], suck, grasp, Babinski Baby is a 36.2 wk GA F mono-di twin born to a 25 y/o  mother via C/S. Maternal history +gHTN not requiring meds, asthma, sickle cell trait. Prenatal history neg. Maternal BT A+. PNL neg, NR, and immune. GBS unknown. ROM at C/S, clear fluids. Apgars 2/8. Mom plans to breastfeed and bottle feed, would like hepB. Pt emerged +breech with poor tone and poor respiratory effort. PPV PEEP 5/ PIP 20 started at 1.5MOL for bradycardia and poor respiratory effort. PPV continued until 5MOL, CPAP 5 initiated at 5MOL until 10MOL with improvement of color and respiratory effort. NICU called to delivery at initiation of PPV. Dr. Ivy present for resuscitation. Pt stooled x2 in DR.     Gen: poor resp effort, hypotonic, cyanotic  HEENT: NC/AT; AFOF; ears and nose clinically patent, normally set; no tags ; no cleft lip/palate, oropharynx clear  Skin: cyanotic, no rash  Resp: poor resp effort  Cardiac: bradycardia < 100 bpm  Abd: soft, NT/ND; no HSM, no masses palpated; umbilicus c/d/I  Back: spine straight, no dimples or liu  Extremities: FROM; no crepitus; negative O/B; R hip held in external rotation  : Avila I; +hymen skin tag; no hernia; anus patent  Neuro: hypotonic; + [place], suck, grasp, Babinski Baby is a 36.2 wk GA F mono-di twin born to a 25 y/o  mother via C/S. Maternal history +gHTN not requiring meds, asthma, sickle cell trait. Prenatal history neg. Maternal BT A+. PNL neg, NR, and immune. GBS unknown. ROM at C/S, clear fluids. Apgars 2/8. Mom plans to breastfeed and bottle feed, would like hepB. Pt emerged +breech with poor tone and poor respiratory effort. PPV PEEP 5/ PIP 20 started at 1.5MOL for bradycardia and poor respiratory effort. PPV continued until 5MOL, CPAP 5 initiated at 5MOL until 10MOL with improvement of color and respiratory effort. NICU called to delivery at initiation of PPV. Dr. Ivy present for resuscitation. Pt stooled x2 in DR.     Gen: poor resp effort, hypotonic, cyanotic  HEENT: NC/AT; AFOF; ears and nose clinically patent, normally set; no tags ; no cleft lip/palate, oropharynx clear  Skin: cyanotic, no rash  Resp: poor resp effort  Cardiac: bradycardia < 100 bpm  Abd: soft, NT/ND; no HSM, no masses palpated; umbilicus c/d/I  Back: spine straight, no dimples or liu  Extremities: FROM; no crepitus; negative O/B; R hip held in external rotation  : Avila I; +hymen skin tag; no hernia; anus patent  Neuro: hypotonic; + [place], suck, grasp, Babinski Baby B is a 36.2 wk GA F mono-di twin born to a 27 y/o  mother via C/S. Maternal history +gHTN not requiring meds, asthma, sickle cell trait. Prenatal history neg. Maternal BT A+. PNL neg, NR, and immune. GBS unknown. ROM at C/S, clear fluids. Apgars 2/8. Mom plans to breastfeed and bottle feed, would like hepB. Pt emerged +breech with poor tone and poor respiratory effort. PPV PEEP 5/ PIP 20 started at 1.5MOL for bradycardia and poor respiratory effort. PPV continued until 5MOL, CPAP 5 initiated at 5MOL until 10MOL with improvement of color and respiratory effort. NICU called to delivery at initiation of PPV. Dr. Ivy present for resuscitation. Pt stooled x2 in      Gen: poor resp effort, hypotonic, cyanotic  HEENT: NC/AT; AFOF; ears and nose clinically patent, normally set; no tags ; no cleft lip/palate, oropharynx clear  Skin: cyanotic, no rash  Resp: poor resp effort  Cardiac: bradycardia < 100 bpm  Abd: soft, NT/ND; no HSM, no masses palpated; umbilicus c/d/I  Back: spine straight, no dimples or liu  Extremities: FROM; no crepitus; negative O/B; R hip held in external rotation  : Avila I; +hymen skin tag; no hernia; anus patent  Neuro: hypotonic; + [place], suck, grasp, Babinski Baby B is a 36.2 wk GA F mono-di twin born to a 27 y/o  mother via C/S. Maternal history +gHTN not requiring meds, asthma, sickle cell trait. Prenatal history neg. Maternal BT A+. PNL neg, NR, and immune. GBS unknown. ROM at C/S, clear fluids. Apgars 2/8. Mom plans to breastfeed and bottle feed, would like hepB. Pt emerged +breech with poor tone and poor respiratory effort. PPV PEEP 5/ PIP 20 started at 1.5MOL for bradycardia and poor respiratory effort. PPV continued until 5MOL, CPAP 5 initiated at 5MOL until 10MOL with improvement of color and respiratory effort. NICU called to delivery at initiation of PPV. Dr. Ivy present for resuscitation. Pt stooled x2 in      Gen: poor resp effort, hypotonic, cyanotic  HEENT: NC/AT; AFOF; ears and nose clinically patent, normally set; no tags ; no cleft lip/palate, oropharynx clear  Skin: cyanotic, no rash  Resp: poor resp effort  Cardiac: bradycardia < 100 bpm  Abd: soft, NT/ND; no HSM, no masses palpated; umbilicus c/d/I  Back: spine straight, no dimples or liu  Extremities: FROM; no crepitus; negative O/B; R hip held in external rotation  : Avila I; +hymen skin tag; no hernia; anus patent  Neuro: hypotonic; + [place], suck, grasp, Babinski Baby A is a 36.2 wk GA F mono-di twin born to a 25 y/o  mother via C/S. Maternal history +gHTN not requiring meds, asthma, sickle cell trait. Prenatal history neg. Maternal BT A+. PNL neg, NR, and immune. GBS unknown. ROM at C/S, clear fluids. Apgars 2/8. Mom plans to breastfeed and bottle feed, would like hepB. Pt emerged +breech with poor tone and poor respiratory effort. PPV PEEP 5/ PIP 20 started at 1.5MOL for bradycardia and poor respiratory effort. PPV continued until 5MOL, CPAP 5 initiated at 5MOL until 10MOL with improvement of color and respiratory effort. NICU called to delivery at initiation of PPV. Dr. Ivy present for resuscitation. Pt stooled x2 in DR.     Gen: poor resp effort, hypotonic, cyanotic  HEENT: NC/AT; AFOF; ears and nose clinically patent, normally set; no tags ; no cleft lip/palate, oropharynx clear  Skin: cyanotic, no rash  Resp: poor resp effort  Cardiac: bradycardia < 100 bpm  Abd: soft, NT/ND; no HSM, no masses palpated; umbilicus c/d/I  Back: spine straight, no dimples or liu  Extremities: FROM; no crepitus; negative O/B; R hip held in external rotation  : Avila I; +hymen skin tag; no hernia; anus patent  Neuro: hypotonic; + [place], suck, grasp, Babinski        Baby B is a 36.2 wk AGA F mono-di twin born to a 25 y/o  mother via C/S. Peds called to delivery for late- twins. Maternal history significant for gHTN (no meds), asthma, and sickle cell trait. Prenatal history uncomplicated. Maternal blood type A+. PNL neg, NR, and immune. GBS unknown. ROM at C/S, clear fluids. Baby born vigorous and crying spontaneously. Warmed, dried, stimulated. Apgars 8/9. EOS 0.17. Mom plans to breast and bottlefeed and consents hepB.  BW: 2680g  : 2023  TOB: 14:15    Physical Exam (Post-Delivery)  Gen: NAD; well-appearing  HEENT: NC/AT; anterior fontanelle open and flat; no cleft palate appreciated  Skin: pink, warm, well-perfused, no rash  Resp: non-labored breathing  Abd: soft, NT/ND; umbilical cord with 3 vessels  Extremities: moving all extremities  MSK: no clavicular fracture appreciated  : Avila I; no abnormalities; anus patent  Back: no sacral dimple  Neuro: +[place], +babinski, grasp, good tone throughout Baby A is a 36.2 wk GA F mono-di twin born to a 25 y/o  mother via C/S. Maternal history +gHTN not requiring meds, asthma, sickle cell trait. Prenatal history neg. Maternal BT A+. PNL neg, NR, and immune. GBS unknown. ROM at C/S, clear fluids. Apgars 2/8. Mom plans to breastfeed and bottle feed, would like hepB. Pt emerged +breech with poor tone and poor respiratory effort. PPV PEEP 5/ PIP 20 started at 1.5MOL for bradycardia and poor respiratory effort. PPV continued until 5MOL, CPAP 5 initiated at 5MOL until 10MOL with improvement of color and respiratory effort. NICU called to delivery at initiation of PPV. Dr. Ivy present for resuscitation. Pt stooled x2 in DR.     Gen: poor resp effort, hypotonic, cyanotic  HEENT: NC/AT; AFOF; ears and nose clinically patent, normally set; no tags ; no cleft lip/palate, oropharynx clear  Skin: cyanotic, no rash  Resp: poor resp effort  Cardiac: bradycardia < 100 bpm  Abd: soft, NT/ND; no HSM, no masses palpated; umbilicus c/d/I  Back: spine straight, no dimples or liu  Extremities: FROM; no crepitus; negative O/B; R hip held in external rotation  : Avila I; +hymen skin tag; no hernia; anus patent  Neuro: hypotonic; + [place], suck, grasp, Babinski        Baby B is a 36.2 wk AGA F mono-di twin born to a 25 y/o  mother via C/S. Peds called to delivery for late- twins. Maternal history significant for gHTN (no meds), asthma, and sickle cell trait. Prenatal history uncomplicated. Maternal blood type A+. PNL neg, NR, and immune. GBS unknown. ROM at C/S, clear fluids. Baby born vigorous and crying spontaneously. Warmed, dried, stimulated. Apgars 8/9. EOS 0.17. Mom plans to breast and bottlefeed and consents hepB.  BW: 2680g  : 2023  TOB: 14:15    Physical Exam (Post-Delivery)  Gen: NAD; well-appearing  HEENT: NC/AT; anterior fontanelle open and flat; no cleft palate appreciated  Skin: pink, warm, well-perfused, no rash  Resp: non-labored breathing  Abd: soft, NT/ND; umbilical cord with 3 vessels  Extremities: moving all extremities  MSK: no clavicular fracture appreciated  : Avila I; no abnormalities; anus patent  Back: no sacral dimple  Neuro: +[place]ce[place], +babinski, grasp, good tone throughout

## 2023-12-26 NOTE — OB RN DELIVERY SUMMARY - NSSELHIDDEN_OBGYN_ALL_OB_FT
[NS_DeliveryAttending1_OBGYN_ALL_OB_FT:[id number]],[NS_DeliveryAssist1_OBGYN_ALL_OB_FT:[id number]],[NS_DeliveryRN_OBGYN_ALL_OB_FT:[id number]] [NS_DeliveryAttending1_OBGYN_ALL_OB_FT:[id number]],[NS_DeliveryAssist1_OBGYN_ALL_OB_FT:[id number]],[NS_DeliveryRN_OBGYN_ALL_OB_FT:[id number]]

## 2023-12-26 NOTE — PROVIDER CONTACT NOTE (OTHER) - ACTION/TREATMENT ORDERED:
Procardia 10 mg po given at 1600; HELLP labs drawn and sent, magnesium sulfate infusion administered as per orders @8643 Procardia 10 mg po given at 1600; HELLP labs drawn and sent, magnesium sulfate infusion administered as per orders @[id number]

## 2023-12-27 LAB
ALBUMIN SERPL ELPH-MCNC: 2.7 G/DL — LOW (ref 3.3–5)
ALBUMIN SERPL ELPH-MCNC: 2.7 G/DL — LOW (ref 3.3–5)
ALP SERPL-CCNC: 138 U/L — HIGH (ref 40–120)
ALP SERPL-CCNC: 138 U/L — HIGH (ref 40–120)
ALT FLD-CCNC: 15 U/L — SIGNIFICANT CHANGE UP (ref 4–33)
ALT FLD-CCNC: 15 U/L — SIGNIFICANT CHANGE UP (ref 4–33)
ANION GAP SERPL CALC-SCNC: 11 MMOL/L — SIGNIFICANT CHANGE UP (ref 7–14)
ANION GAP SERPL CALC-SCNC: 11 MMOL/L — SIGNIFICANT CHANGE UP (ref 7–14)
APTT BLD: 25.5 SEC — SIGNIFICANT CHANGE UP (ref 24.5–35.6)
APTT BLD: 25.5 SEC — SIGNIFICANT CHANGE UP (ref 24.5–35.6)
AST SERPL-CCNC: 10 U/L — SIGNIFICANT CHANGE UP (ref 4–32)
AST SERPL-CCNC: 10 U/L — SIGNIFICANT CHANGE UP (ref 4–32)
BASOPHILS # BLD AUTO: 0.03 K/UL — SIGNIFICANT CHANGE UP (ref 0–0.2)
BASOPHILS # BLD AUTO: 0.03 K/UL — SIGNIFICANT CHANGE UP (ref 0–0.2)
BASOPHILS NFR BLD AUTO: 0.2 % — SIGNIFICANT CHANGE UP (ref 0–2)
BASOPHILS NFR BLD AUTO: 0.2 % — SIGNIFICANT CHANGE UP (ref 0–2)
BILIRUB SERPL-MCNC: 0.9 MG/DL — SIGNIFICANT CHANGE UP (ref 0.2–1.2)
BILIRUB SERPL-MCNC: 0.9 MG/DL — SIGNIFICANT CHANGE UP (ref 0.2–1.2)
BUN SERPL-MCNC: 5 MG/DL — LOW (ref 7–23)
BUN SERPL-MCNC: 5 MG/DL — LOW (ref 7–23)
CALCIUM SERPL-MCNC: 7.5 MG/DL — LOW (ref 8.4–10.5)
CALCIUM SERPL-MCNC: 7.5 MG/DL — LOW (ref 8.4–10.5)
CHLORIDE SERPL-SCNC: 102 MMOL/L — SIGNIFICANT CHANGE UP (ref 98–107)
CHLORIDE SERPL-SCNC: 102 MMOL/L — SIGNIFICANT CHANGE UP (ref 98–107)
CO2 SERPL-SCNC: 22 MMOL/L — SIGNIFICANT CHANGE UP (ref 22–31)
CO2 SERPL-SCNC: 22 MMOL/L — SIGNIFICANT CHANGE UP (ref 22–31)
CREAT SERPL-MCNC: 0.63 MG/DL — SIGNIFICANT CHANGE UP (ref 0.5–1.3)
CREAT SERPL-MCNC: 0.63 MG/DL — SIGNIFICANT CHANGE UP (ref 0.5–1.3)
EGFR: 125 ML/MIN/1.73M2 — SIGNIFICANT CHANGE UP
EGFR: 125 ML/MIN/1.73M2 — SIGNIFICANT CHANGE UP
EOSINOPHIL # BLD AUTO: 0.02 K/UL — SIGNIFICANT CHANGE UP (ref 0–0.5)
EOSINOPHIL # BLD AUTO: 0.02 K/UL — SIGNIFICANT CHANGE UP (ref 0–0.5)
EOSINOPHIL NFR BLD AUTO: 0.2 % — SIGNIFICANT CHANGE UP (ref 0–6)
EOSINOPHIL NFR BLD AUTO: 0.2 % — SIGNIFICANT CHANGE UP (ref 0–6)
FIBRINOGEN PPP-MCNC: 397 MG/DL — SIGNIFICANT CHANGE UP (ref 200–465)
FIBRINOGEN PPP-MCNC: 397 MG/DL — SIGNIFICANT CHANGE UP (ref 200–465)
GLUCOSE SERPL-MCNC: 72 MG/DL — SIGNIFICANT CHANGE UP (ref 70–99)
GLUCOSE SERPL-MCNC: 72 MG/DL — SIGNIFICANT CHANGE UP (ref 70–99)
HCT VFR BLD CALC: 30.4 % — LOW (ref 34.5–45)
HCT VFR BLD CALC: 30.4 % — LOW (ref 34.5–45)
HGB BLD-MCNC: 10.5 G/DL — LOW (ref 11.5–15.5)
HGB BLD-MCNC: 10.5 G/DL — LOW (ref 11.5–15.5)
IANC: 9.37 K/UL — HIGH (ref 1.8–7.4)
IANC: 9.37 K/UL — HIGH (ref 1.8–7.4)
IMM GRANULOCYTES NFR BLD AUTO: 0.6 % — SIGNIFICANT CHANGE UP (ref 0–0.9)
IMM GRANULOCYTES NFR BLD AUTO: 0.6 % — SIGNIFICANT CHANGE UP (ref 0–0.9)
INR BLD: 0.92 RATIO — SIGNIFICANT CHANGE UP (ref 0.85–1.18)
INR BLD: 0.92 RATIO — SIGNIFICANT CHANGE UP (ref 0.85–1.18)
LDH SERPL L TO P-CCNC: 304 U/L — HIGH (ref 135–225)
LDH SERPL L TO P-CCNC: 304 U/L — HIGH (ref 135–225)
LYMPHOCYTES # BLD AUTO: 1.74 K/UL — SIGNIFICANT CHANGE UP (ref 1–3.3)
LYMPHOCYTES # BLD AUTO: 1.74 K/UL — SIGNIFICANT CHANGE UP (ref 1–3.3)
LYMPHOCYTES # BLD AUTO: 13.8 % — SIGNIFICANT CHANGE UP (ref 13–44)
LYMPHOCYTES # BLD AUTO: 13.8 % — SIGNIFICANT CHANGE UP (ref 13–44)
MAGNESIUM SERPL-MCNC: 3.7 MG/DL — HIGH (ref 1.6–2.6)
MAGNESIUM SERPL-MCNC: 3.7 MG/DL — HIGH (ref 1.6–2.6)
MAGNESIUM SERPL-MCNC: 4.4 MG/DL — HIGH (ref 1.6–2.6)
MAGNESIUM SERPL-MCNC: 4.4 MG/DL — HIGH (ref 1.6–2.6)
MCHC RBC-ENTMCNC: 31 PG — SIGNIFICANT CHANGE UP (ref 27–34)
MCHC RBC-ENTMCNC: 31 PG — SIGNIFICANT CHANGE UP (ref 27–34)
MCHC RBC-ENTMCNC: 34.5 GM/DL — SIGNIFICANT CHANGE UP (ref 32–36)
MCHC RBC-ENTMCNC: 34.5 GM/DL — SIGNIFICANT CHANGE UP (ref 32–36)
MCV RBC AUTO: 89.7 FL — SIGNIFICANT CHANGE UP (ref 80–100)
MCV RBC AUTO: 89.7 FL — SIGNIFICANT CHANGE UP (ref 80–100)
MONOCYTES # BLD AUTO: 1.36 K/UL — HIGH (ref 0–0.9)
MONOCYTES # BLD AUTO: 1.36 K/UL — HIGH (ref 0–0.9)
MONOCYTES NFR BLD AUTO: 10.8 % — SIGNIFICANT CHANGE UP (ref 2–14)
MONOCYTES NFR BLD AUTO: 10.8 % — SIGNIFICANT CHANGE UP (ref 2–14)
NEUTROPHILS # BLD AUTO: 9.37 K/UL — HIGH (ref 1.8–7.4)
NEUTROPHILS # BLD AUTO: 9.37 K/UL — HIGH (ref 1.8–7.4)
NEUTROPHILS NFR BLD AUTO: 74.4 % — SIGNIFICANT CHANGE UP (ref 43–77)
NEUTROPHILS NFR BLD AUTO: 74.4 % — SIGNIFICANT CHANGE UP (ref 43–77)
NRBC # BLD: 0 /100 WBCS — SIGNIFICANT CHANGE UP (ref 0–0)
NRBC # BLD: 0 /100 WBCS — SIGNIFICANT CHANGE UP (ref 0–0)
NRBC # FLD: 0 K/UL — SIGNIFICANT CHANGE UP (ref 0–0)
NRBC # FLD: 0 K/UL — SIGNIFICANT CHANGE UP (ref 0–0)
PLATELET # BLD AUTO: 214 K/UL — SIGNIFICANT CHANGE UP (ref 150–400)
PLATELET # BLD AUTO: 214 K/UL — SIGNIFICANT CHANGE UP (ref 150–400)
POTASSIUM SERPL-MCNC: 3.7 MMOL/L — SIGNIFICANT CHANGE UP (ref 3.5–5.3)
POTASSIUM SERPL-MCNC: 3.7 MMOL/L — SIGNIFICANT CHANGE UP (ref 3.5–5.3)
POTASSIUM SERPL-SCNC: 3.7 MMOL/L — SIGNIFICANT CHANGE UP (ref 3.5–5.3)
POTASSIUM SERPL-SCNC: 3.7 MMOL/L — SIGNIFICANT CHANGE UP (ref 3.5–5.3)
PROT SERPL-MCNC: 5.4 G/DL — LOW (ref 6–8.3)
PROT SERPL-MCNC: 5.4 G/DL — LOW (ref 6–8.3)
PROTHROM AB SERPL-ACNC: 10.4 SEC — SIGNIFICANT CHANGE UP (ref 9.5–13)
PROTHROM AB SERPL-ACNC: 10.4 SEC — SIGNIFICANT CHANGE UP (ref 9.5–13)
RBC # BLD: 3.39 M/UL — LOW (ref 3.8–5.2)
RBC # BLD: 3.39 M/UL — LOW (ref 3.8–5.2)
RBC # FLD: 13.2 % — SIGNIFICANT CHANGE UP (ref 10.3–14.5)
RBC # FLD: 13.2 % — SIGNIFICANT CHANGE UP (ref 10.3–14.5)
SODIUM SERPL-SCNC: 135 MMOL/L — SIGNIFICANT CHANGE UP (ref 135–145)
SODIUM SERPL-SCNC: 135 MMOL/L — SIGNIFICANT CHANGE UP (ref 135–145)
URATE SERPL-MCNC: 5.1 MG/DL — SIGNIFICANT CHANGE UP (ref 2.5–7)
URATE SERPL-MCNC: 5.1 MG/DL — SIGNIFICANT CHANGE UP (ref 2.5–7)
WBC # BLD: 12.6 K/UL — HIGH (ref 3.8–10.5)
WBC # BLD: 12.6 K/UL — HIGH (ref 3.8–10.5)
WBC # FLD AUTO: 12.6 K/UL — HIGH (ref 3.8–10.5)
WBC # FLD AUTO: 12.6 K/UL — HIGH (ref 3.8–10.5)

## 2023-12-27 RX ORDER — SODIUM CHLORIDE 9 MG/ML
1000 INJECTION, SOLUTION INTRAVENOUS
Refills: 0 | Status: DISCONTINUED | OUTPATIENT
Start: 2023-12-27 | End: 2023-12-28

## 2023-12-27 RX ORDER — IBUPROFEN 200 MG
600 TABLET ORAL EVERY 6 HOURS
Refills: 0 | Status: DISCONTINUED | OUTPATIENT
Start: 2023-12-27 | End: 2023-12-29

## 2023-12-27 RX ADMIN — Medication 30 MILLIGRAM(S): at 12:20

## 2023-12-27 RX ADMIN — Medication 975 MILLIGRAM(S): at 09:41

## 2023-12-27 RX ADMIN — Medication 975 MILLIGRAM(S): at 22:30

## 2023-12-27 RX ADMIN — Medication 50 GM/HR: at 05:20

## 2023-12-27 RX ADMIN — Medication 50 GM/HR: at 07:33

## 2023-12-27 RX ADMIN — Medication 30 MILLIGRAM(S): at 05:25

## 2023-12-27 RX ADMIN — Medication 975 MILLIGRAM(S): at 14:59

## 2023-12-27 RX ADMIN — Medication 975 MILLIGRAM(S): at 10:20

## 2023-12-27 RX ADMIN — Medication 600 MILLIGRAM(S): at 19:04

## 2023-12-27 RX ADMIN — Medication 325 MILLIGRAM(S): at 12:21

## 2023-12-27 RX ADMIN — Medication 30 MILLIGRAM(S): at 01:53

## 2023-12-27 RX ADMIN — Medication 975 MILLIGRAM(S): at 21:46

## 2023-12-27 RX ADMIN — HEPARIN SODIUM 5000 UNIT(S): 5000 INJECTION INTRAVENOUS; SUBCUTANEOUS at 21:46

## 2023-12-27 RX ADMIN — Medication 975 MILLIGRAM(S): at 15:29

## 2023-12-27 RX ADMIN — Medication 30 MILLIGRAM(S): at 06:20

## 2023-12-27 RX ADMIN — HEPARIN SODIUM 5000 UNIT(S): 5000 INJECTION INTRAVENOUS; SUBCUTANEOUS at 09:41

## 2023-12-27 RX ADMIN — SODIUM CHLORIDE 50 MILLILITER(S): 9 INJECTION, SOLUTION INTRAVENOUS at 07:08

## 2023-12-27 RX ADMIN — Medication 30 MILLIGRAM(S): at 12:55

## 2023-12-27 NOTE — PROGRESS NOTE ADULT - SUBJECTIVE AND OBJECTIVE BOX
OB Progress Note:  Delivery, POD#1    S: 29yo POD#1 s/p pLTCS c/b sPEC on Mg. Her pain is well controlled. She is tolerating a regular diet, no flatus yet. Has not been out of bed yet, Ham in place. Denies N/V. Denies CP/SOB/lightheadedness/dizziness. Endorses light vaginal bleeding, less than one pad per hour.     O:   Vital Signs Last 24 Hrs  T(C): 37 (27 Dec 2023 04:10), Max: 37.1 (26 Dec 2023 15:45)  T(F): 98.6 (27 Dec 2023 04:10), Max: 98.8 (26 Dec 2023 15:45)  HR: 77 (27 Dec 2023 06:10) (69 - 103)  BP: 119/56 (27 Dec 2023 06:10) (107/49 - 179/51)  BP(mean): 75 (26 Dec 2023 20:30) (60 - 95)  RR: 18 (27 Dec 2023 06:10) (17 - 27)  SpO2: 98% (27 Dec 2023 06:10) (95% - 100%)    Parameters below as of 27 Dec 2023 06:10  Patient On (Oxygen Delivery Method): room air        Labs:  Blood type: A Positive  Rubella IgG: RPR: Negative                          10.5<L>   12.60<H> >-----------< 214    (  @ 05:00 )             30.4<L>                        12.3   12.75<H> >-----------< 236    (  @ 16:05 )             37.3                        11.8   8.29 >-----------< 221    (  @ 06:52 )             35.2    23 @ 05:00      135  |  102  |  5<L>  ----------------------------<  72  3.7   |  22  |  0.63    23 @ 16:05      142  |  108<H>  |  4<L>  ----------------------------<  74  4.4   |  23  |  0.52    23 @ 06:52      139  |  105  |  4<L>  ----------------------------<  93  3.4<L>   |  22  |  0.59        Ca    7.5<L>      27 Dec 2023 05:00  Ca    8.6      26 Dec 2023 16:05  Ca    8.8      26 Dec 2023 06:52  Mg     3.70<H>       Mg     3.70<H>         TPro  5.4<L>  /  Alb  2.7<L>  /  TBili  0.9  /  DBili  x   /  AST  10  /  ALT  15  /  AlkPhos  138<H>  23 @ 05:00  TPro  6.3  /  Alb  3.0<L>  /  TBili  1.0  /  DBili  x   /  AST  9   /  ALT  17  /  AlkPhos  168<H>  23 @ 16:05  TPro  6.4  /  Alb  3.2<L>  /  TBili  0.9  /  DBili  x   /  AST  10  /  ALT  17  /  AlkPhos  168<H>  23 @ 06:52          PE:  General: NAD  Heart: extremities well-perfused  Lungs: breathing comfortably  Abdomen: Mildly distended, appropriately tender, fundus firm, incision c/d/i.  Extremities: No erythema, no pitting edema  Gyn: lochia wnl     OB Progress Note:  Delivery, POD#1    S: 27yo POD#1 s/p pLTCS c/b sPEC on Mg. Her pain is well controlled. She is tolerating a regular diet, no flatus yet. Has not been out of bed yet, Ham in place. Denies N/V. Denies CP/SOB/lightheadedness/dizziness. Endorses light vaginal bleeding, less than one pad per hour.     O:   Vital Signs Last 24 Hrs  T(C): 37 (27 Dec 2023 04:10), Max: 37.1 (26 Dec 2023 15:45)  T(F): 98.6 (27 Dec 2023 04:10), Max: 98.8 (26 Dec 2023 15:45)  HR: 77 (27 Dec 2023 06:10) (69 - 103)  BP: 119/56 (27 Dec 2023 06:10) (107/49 - 179/51)  BP(mean): 75 (26 Dec 2023 20:30) (60 - 95)  RR: 18 (27 Dec 2023 06:10) (17 - 27)  SpO2: 98% (27 Dec 2023 06:10) (95% - 100%)    Parameters below as of 27 Dec 2023 06:10  Patient On (Oxygen Delivery Method): room air        Labs:  Blood type: A Positive  Rubella IgG: RPR: Negative                          10.5<L>   12.60<H> >-----------< 214    (  @ 05:00 )             30.4<L>                        12.3   12.75<H> >-----------< 236    (  @ 16:05 )             37.3                        11.8   8.29 >-----------< 221    (  @ 06:52 )             35.2    23 @ 05:00      135  |  102  |  5<L>  ----------------------------<  72  3.7   |  22  |  0.63    23 @ 16:05      142  |  108<H>  |  4<L>  ----------------------------<  74  4.4   |  23  |  0.52    23 @ 06:52      139  |  105  |  4<L>  ----------------------------<  93  3.4<L>   |  22  |  0.59        Ca    7.5<L>      27 Dec 2023 05:00  Ca    8.6      26 Dec 2023 16:05  Ca    8.8      26 Dec 2023 06:52  Mg     3.70<H>       Mg     3.70<H>         TPro  5.4<L>  /  Alb  2.7<L>  /  TBili  0.9  /  DBili  x   /  AST  10  /  ALT  15  /  AlkPhos  138<H>  23 @ 05:00  TPro  6.3  /  Alb  3.0<L>  /  TBili  1.0  /  DBili  x   /  AST  9   /  ALT  17  /  AlkPhos  168<H>  23 @ 16:05  TPro  6.4  /  Alb  3.2<L>  /  TBili  0.9  /  DBili  x   /  AST  10  /  ALT  17  /  AlkPhos  168<H>  23 @ 06:52          PE:  General: NAD  Heart: extremities well-perfused  Lungs: breathing comfortably  Abdomen: Mildly distended, appropriately tender, fundus firm, incision c/d/i.  Extremities: No erythema, no pitting edema  Gyn: lochia wnl

## 2023-12-27 NOTE — PROGRESS NOTE ADULT - SUBJECTIVE AND OBJECTIVE BOX
INTERVAL HPI/OVERNIGHT EVENTS:  28y Female s/p c section under spinal anesthesia with duramorph for post op analgesia      Vital Signs Last 24 Hrs  T(C): 37 (27 Dec 2023 12:00), Max: 37.1 (26 Dec 2023 15:45)  T(F): 98.6 (27 Dec 2023 12:00), Max: 98.8 (26 Dec 2023 15:45)  HR: 86 (27 Dec 2023 12:00) (69 - 103)  BP: 118/49 (27 Dec 2023 12:00) (107/49 - 179/51)  BP(mean): 75 (26 Dec 2023 20:30) (60 - 95)  RR: 18 (27 Dec 2023 12:00) (17 - 27)  SpO2: 99% (27 Dec 2023 12:00) (95% - 100%)    Parameters below as of 27 Dec 2023 12:00  Patient On (Oxygen Delivery Method): room air            Patient's overall anesthesia satisfaction: Positive    Patients pain is well controlled with IT duramorph    No respiratory events overnight    No pruritis at this time    Patient doing well     No headache      No residual numbness or weakness, sensory and motor function intact.    No anesthetic complications or complaints noted or reported          .

## 2023-12-27 NOTE — PROGRESS NOTE ADULT - ASSESSMENT
A/P: 29yo POD#1 s/p LTCS.  Patient is stable and doing well post-operatively.    - Continue regular diet.  - Increase ambulation.  - Continue motrin, tylenol, oxycodone PRN for pain control.    - Ham out this AM, monitor for void  - QBL: 1182, Hct: 37.3->30.4    #sPEC (sBP)  - HELLP wnl, P/C 0.3, repeat HELLP this AM wnl   - Mg (12/26- ), d/c this afternoon after 24h PP  - s/p Pro 10 IR 12/26, no standing antihypertensives   - BP overnight 107-122/49-56   - Asymptomatic   - Continue to monitor     Jacquie Reid, PGY1 A/P: 27yo POD#1 s/p LTCS.  Patient is stable and doing well post-operatively.    - Continue regular diet.  - Increase ambulation.  - Continue motrin, tylenol, oxycodone PRN for pain control.    - Ham out this AM, monitor for void  - QBL: 1182, Hct: 37.3->30.4    #sPEC (sBP)  - HELLP wnl, P/C 0.3, repeat HELLP this AM wnl   - Mg (12/26- ), d/c this afternoon after 24h PP  - s/p Pro 10 IR 12/26, no standing antihypertensives   - BP overnight 107-122/49-56   - Asymptomatic   - Continue to monitor     Jacquie Reid, PGY1 A/P: 29yo POD#1 s/p LTCS.  Patient is stable and doing well post-operatively.    - Continue regular diet.  - Increase ambulation.  - Continue motrin, tylenol, oxycodone PRN for pain control.    - Izaguirre out this AM, monitor for void  - QBL: 1182, Hct: 37.3->30.4    #sPEC (sBP)  - HELLP wnl, P/C 0.3, repeat HELLP this AM wnl   - Mg (12/26- ), d/c this afternoon after 24h PP  - s/p Pro 10 IR 12/26, no standing antihypertensives   - BP overnight 107-122/49-56   - Asymptomatic   - Continue to monitor     Jacquie Reid, PGY1    Agree with above findings. 29yo  s/p Mono/di twin complicated by PreE w/ SF. s/p MgSO4. s/p izaguirre. Ambulating and eating regular food without difficulty. Has not passed flatus or voided yet. VSS. Labs are stable  Cont POD care  Encourage breastfeeding, ambulation, iss use, and abdominal binder  f/u bps  Anticipate discharge in 2 days.   Evelina Gonzalez MD MPH

## 2023-12-28 RX ADMIN — Medication 975 MILLIGRAM(S): at 08:45

## 2023-12-28 RX ADMIN — Medication 600 MILLIGRAM(S): at 00:23

## 2023-12-28 RX ADMIN — Medication 975 MILLIGRAM(S): at 16:29

## 2023-12-28 RX ADMIN — Medication 600 MILLIGRAM(S): at 01:15

## 2023-12-28 RX ADMIN — Medication 325 MILLIGRAM(S): at 11:15

## 2023-12-28 RX ADMIN — Medication 975 MILLIGRAM(S): at 15:56

## 2023-12-28 RX ADMIN — HEPARIN SODIUM 5000 UNIT(S): 5000 INJECTION INTRAVENOUS; SUBCUTANEOUS at 11:15

## 2023-12-28 RX ADMIN — Medication 975 MILLIGRAM(S): at 20:46

## 2023-12-28 RX ADMIN — Medication 600 MILLIGRAM(S): at 23:41

## 2023-12-28 RX ADMIN — HEPARIN SODIUM 5000 UNIT(S): 5000 INJECTION INTRAVENOUS; SUBCUTANEOUS at 23:41

## 2023-12-28 RX ADMIN — Medication 600 MILLIGRAM(S): at 18:03

## 2023-12-28 RX ADMIN — Medication 975 MILLIGRAM(S): at 21:20

## 2023-12-28 RX ADMIN — Medication 600 MILLIGRAM(S): at 18:44

## 2023-12-28 RX ADMIN — Medication 975 MILLIGRAM(S): at 03:25

## 2023-12-28 RX ADMIN — Medication 600 MILLIGRAM(S): at 11:15

## 2023-12-28 RX ADMIN — Medication 600 MILLIGRAM(S): at 06:06

## 2023-12-28 RX ADMIN — Medication 975 MILLIGRAM(S): at 09:20

## 2023-12-28 RX ADMIN — Medication 975 MILLIGRAM(S): at 04:15

## 2023-12-28 RX ADMIN — Medication 600 MILLIGRAM(S): at 12:00

## 2023-12-28 RX ADMIN — Medication 600 MILLIGRAM(S): at 06:36

## 2023-12-28 NOTE — PROGRESS NOTE ADULT - SUBJECTIVE AND OBJECTIVE BOX
OB Progress Note: LTCS, POD#2    S: 29yo POD#2 s/p pLTCS 2/2 TUIP (Breech, Transverse) c/b spEC s/p Mg. Pain is well controlled. She is tolerating a regular diet and passing flatus. She is voiding spontaneously, and ambulating without difficulty. Denies CP/SOB. Denies lightheadedness/dizziness. Denies N/V.    O:  Vitals:  Vital Signs Last 24 Hrs  T(C): 37 (28 Dec 2023 01:09), Max: 37.4 (27 Dec 2023 22:37)  T(F): 98.6 (28 Dec 2023 01:09), Max: 99.3 (27 Dec 2023 22:37)  HR: 79 (28 Dec 2023 01:09) (77 - 91)  BP: 127/60 (28 Dec 2023 01:09) (115/55 - 143/64)  BP(mean): --  RR: 18 (28 Dec 2023 01:09) (18 - 18)  SpO2: 100% (28 Dec 2023 01:09) (98% - 100%)    Parameters below as of 27 Dec 2023 14:00  Patient On (Oxygen Delivery Method): room air        MEDICATIONS  (STANDING):  acetaminophen     Tablet .. 975 milliGRAM(s) Oral <User Schedule>  diphtheria/tetanus/pertussis (acellular) Vaccine (Adacel) 0.5 milliLiter(s) IntraMuscular once  ferrous    sulfate 325 milliGRAM(s) Oral daily  heparin   Injectable 5000 Unit(s) SubCutaneous every 12 hours  ibuprofen  Tablet. 600 milliGRAM(s) Oral every 6 hours  influenza   Vaccine 0.5 milliLiter(s) IntraMuscular once  lactated ringers. 1000 milliLiter(s) (50 mL/Hr) IV Continuous <Continuous>      MEDICATIONS  (PRN):  albuterol    90 MICROgram(s) HFA Inhaler 2 Puff(s) Inhalation every 6 hours PRN Shortness of Breath and/or Wheezing  dexAMETHasone  Injectable 4 milliGRAM(s) IV Push every 6 hours PRN Nausea  diphenhydrAMINE 25 milliGRAM(s) Oral every 6 hours PRN Pruritus  lanolin Ointment 1 Application(s) Topical every 6 hours PRN Sore Nipples  magnesium hydroxide Suspension 30 milliLiter(s) Oral two times a day PRN Constipation  nalbuphine Injectable 2.5 milliGRAM(s) IV Push every 6 hours PRN Pruritus  naloxone Injectable 0.1 milliGRAM(s) IV Push every 3 minutes PRN For ANY of the following changes in patient status:  A. Breaths Per Minute LESS THAN 10, B. Oxygen saturation LESS THAN 90%, C. Sedation score of 6 for Stop After: 4 Times  ondansetron Injectable 4 milliGRAM(s) IV Push every 6 hours PRN Nausea  oxyCODONE    IR 5 milliGRAM(s) Oral every 3 hours PRN Moderate to Severe Pain (4-10)  oxyCODONE    IR 5 milliGRAM(s) Oral once PRN Moderate to Severe Pain (4-10)  simethicone 80 milliGRAM(s) Chew every 4 hours PRN Gas      Labs:  Blood type: A Positive  Rubella IgG: RPR: Negative                          10.5<L>   12.60<H> >-----------< 214    ( 12-27 @ 05:00 )             30.4<L>                        12.3   12.75<H> >-----------< 236    ( 12-26 @ 16:05 )             37.3                        11.8   8.29 >-----------< 221    ( 12-26 @ 06:52 )             35.2    12-27-23 @ 05:00      135  |  102  |  5<L>  ----------------------------<  72  3.7   |  22  |  0.63    12-26-23 @ 16:05      142  |  108<H>  |  4<L>  ----------------------------<  74  4.4   |  23  |  0.52    12-26-23 @ 06:52      139  |  105  |  4<L>  ----------------------------<  93  3.4<L>   |  22  |  0.59        Ca    7.5<L>      27 Dec 2023 05:00  Ca    8.6      26 Dec 2023 16:05  Ca    8.8      26 Dec 2023 06:52  Mg     4.40<H>     12-27  Mg     3.70<H>     12-27  Mg     3.70<H>     12-26    TPro  5.4<L>  /  Alb  2.7<L>  /  TBili  0.9  /  DBili  x   /  AST  10  /  ALT  15  /  AlkPhos  138<H>  12-27-23 @ 05:00  TPro  6.3  /  Alb  3.0<L>  /  TBili  1.0  /  DBili  x   /  AST  9   /  ALT  17  /  AlkPhos  168<H>  12-26-23 @ 16:05  TPro  6.4  /  Alb  3.2<L>  /  TBili  0.9  /  DBili  x   /  AST  10  /  ALT  17  /  AlkPhos  168<H>  12-26-23 @ 06:52          PE:  General: NAD  Abdomen: Soft, appropriately tender, incision c/d/i.  Extremities: No erythema, no pitting edema     OB Progress Note: LTCS, POD#2    S: 27yo POD#2 s/p pLTCS 2/2 TUIP (Breech, Transverse) c/b spEC s/p Mg. Pain is well controlled. She is tolerating a regular diet and passing flatus. She is voiding spontaneously, and ambulating without difficulty. Denies CP/SOB. Denies lightheadedness/dizziness. Denies N/V.    O:  Vitals:  Vital Signs Last 24 Hrs  T(C): 37 (28 Dec 2023 01:09), Max: 37.4 (27 Dec 2023 22:37)  T(F): 98.6 (28 Dec 2023 01:09), Max: 99.3 (27 Dec 2023 22:37)  HR: 79 (28 Dec 2023 01:09) (77 - 91)  BP: 127/60 (28 Dec 2023 01:09) (115/55 - 143/64)  BP(mean): --  RR: 18 (28 Dec 2023 01:09) (18 - 18)  SpO2: 100% (28 Dec 2023 01:09) (98% - 100%)    Parameters below as of 27 Dec 2023 14:00  Patient On (Oxygen Delivery Method): room air        MEDICATIONS  (STANDING):  acetaminophen     Tablet .. 975 milliGRAM(s) Oral <User Schedule>  diphtheria/tetanus/pertussis (acellular) Vaccine (Adacel) 0.5 milliLiter(s) IntraMuscular once  ferrous    sulfate 325 milliGRAM(s) Oral daily  heparin   Injectable 5000 Unit(s) SubCutaneous every 12 hours  ibuprofen  Tablet. 600 milliGRAM(s) Oral every 6 hours  influenza   Vaccine 0.5 milliLiter(s) IntraMuscular once  lactated ringers. 1000 milliLiter(s) (50 mL/Hr) IV Continuous <Continuous>      MEDICATIONS  (PRN):  albuterol    90 MICROgram(s) HFA Inhaler 2 Puff(s) Inhalation every 6 hours PRN Shortness of Breath and/or Wheezing  dexAMETHasone  Injectable 4 milliGRAM(s) IV Push every 6 hours PRN Nausea  diphenhydrAMINE 25 milliGRAM(s) Oral every 6 hours PRN Pruritus  lanolin Ointment 1 Application(s) Topical every 6 hours PRN Sore Nipples  magnesium hydroxide Suspension 30 milliLiter(s) Oral two times a day PRN Constipation  nalbuphine Injectable 2.5 milliGRAM(s) IV Push every 6 hours PRN Pruritus  naloxone Injectable 0.1 milliGRAM(s) IV Push every 3 minutes PRN For ANY of the following changes in patient status:  A. Breaths Per Minute LESS THAN 10, B. Oxygen saturation LESS THAN 90%, C. Sedation score of 6 for Stop After: 4 Times  ondansetron Injectable 4 milliGRAM(s) IV Push every 6 hours PRN Nausea  oxyCODONE    IR 5 milliGRAM(s) Oral every 3 hours PRN Moderate to Severe Pain (4-10)  oxyCODONE    IR 5 milliGRAM(s) Oral once PRN Moderate to Severe Pain (4-10)  simethicone 80 milliGRAM(s) Chew every 4 hours PRN Gas      Labs:  Blood type: A Positive  Rubella IgG: RPR: Negative                          10.5<L>   12.60<H> >-----------< 214    ( 12-27 @ 05:00 )             30.4<L>                        12.3   12.75<H> >-----------< 236    ( 12-26 @ 16:05 )             37.3                        11.8   8.29 >-----------< 221    ( 12-26 @ 06:52 )             35.2    12-27-23 @ 05:00      135  |  102  |  5<L>  ----------------------------<  72  3.7   |  22  |  0.63    12-26-23 @ 16:05      142  |  108<H>  |  4<L>  ----------------------------<  74  4.4   |  23  |  0.52    12-26-23 @ 06:52      139  |  105  |  4<L>  ----------------------------<  93  3.4<L>   |  22  |  0.59        Ca    7.5<L>      27 Dec 2023 05:00  Ca    8.6      26 Dec 2023 16:05  Ca    8.8      26 Dec 2023 06:52  Mg     4.40<H>     12-27  Mg     3.70<H>     12-27  Mg     3.70<H>     12-26    TPro  5.4<L>  /  Alb  2.7<L>  /  TBili  0.9  /  DBili  x   /  AST  10  /  ALT  15  /  AlkPhos  138<H>  12-27-23 @ 05:00  TPro  6.3  /  Alb  3.0<L>  /  TBili  1.0  /  DBili  x   /  AST  9   /  ALT  17  /  AlkPhos  168<H>  12-26-23 @ 16:05  TPro  6.4  /  Alb  3.2<L>  /  TBili  0.9  /  DBili  x   /  AST  10  /  ALT  17  /  AlkPhos  168<H>  12-26-23 @ 06:52          PE:  General: NAD  Abdomen: Soft, appropriately tender, incision c/d/i.  Extremities: No erythema, no pitting edema     OB Progress Note: LTCS, POD#2    S: 29yo POD#2 s/p pLTCS 2/2 TIUP (Breech, Transverse) c/b spEC s/p Mg. Pain is well controlled. She is tolerating a regular diet and passing flatus. She is voiding spontaneously, and ambulating without difficulty. Denies CP/SOB. Denies lightheadedness/dizziness. Denies N/V. Denies HA, vision changes, epigastric pain.    O:  Vitals:  Vital Signs Last 24 Hrs  T(C): 37 (28 Dec 2023 01:09), Max: 37.4 (27 Dec 2023 22:37)  T(F): 98.6 (28 Dec 2023 01:09), Max: 99.3 (27 Dec 2023 22:37)  HR: 79 (28 Dec 2023 01:09) (77 - 91)  BP: 127/60 (28 Dec 2023 01:09) (115/55 - 143/64)  BP(mean): --  RR: 18 (28 Dec 2023 01:09) (18 - 18)  SpO2: 100% (28 Dec 2023 01:09) (98% - 100%)    Parameters below as of 27 Dec 2023 14:00  Patient On (Oxygen Delivery Method): room air        MEDICATIONS  (STANDING):  acetaminophen     Tablet .. 975 milliGRAM(s) Oral <User Schedule>  diphtheria/tetanus/pertussis (acellular) Vaccine (Adacel) 0.5 milliLiter(s) IntraMuscular once  ferrous    sulfate 325 milliGRAM(s) Oral daily  heparin   Injectable 5000 Unit(s) SubCutaneous every 12 hours  ibuprofen  Tablet. 600 milliGRAM(s) Oral every 6 hours  influenza   Vaccine 0.5 milliLiter(s) IntraMuscular once  lactated ringers. 1000 milliLiter(s) (50 mL/Hr) IV Continuous <Continuous>      MEDICATIONS  (PRN):  albuterol    90 MICROgram(s) HFA Inhaler 2 Puff(s) Inhalation every 6 hours PRN Shortness of Breath and/or Wheezing  dexAMETHasone  Injectable 4 milliGRAM(s) IV Push every 6 hours PRN Nausea  diphenhydrAMINE 25 milliGRAM(s) Oral every 6 hours PRN Pruritus  lanolin Ointment 1 Application(s) Topical every 6 hours PRN Sore Nipples  magnesium hydroxide Suspension 30 milliLiter(s) Oral two times a day PRN Constipation  nalbuphine Injectable 2.5 milliGRAM(s) IV Push every 6 hours PRN Pruritus  naloxone Injectable 0.1 milliGRAM(s) IV Push every 3 minutes PRN For ANY of the following changes in patient status:  A. Breaths Per Minute LESS THAN 10, B. Oxygen saturation LESS THAN 90%, C. Sedation score of 6 for Stop After: 4 Times  ondansetron Injectable 4 milliGRAM(s) IV Push every 6 hours PRN Nausea  oxyCODONE    IR 5 milliGRAM(s) Oral every 3 hours PRN Moderate to Severe Pain (4-10)  oxyCODONE    IR 5 milliGRAM(s) Oral once PRN Moderate to Severe Pain (4-10)  simethicone 80 milliGRAM(s) Chew every 4 hours PRN Gas      Labs:  Blood type: A Positive  Rubella IgG: RPR: Negative                          10.5<L>   12.60<H> >-----------< 214    ( 12-27 @ 05:00 )             30.4<L>                        12.3   12.75<H> >-----------< 236    ( 12-26 @ 16:05 )             37.3                        11.8   8.29 >-----------< 221    ( 12-26 @ 06:52 )             35.2    12-27-23 @ 05:00      135  |  102  |  5<L>  ----------------------------<  72  3.7   |  22  |  0.63    12-26-23 @ 16:05      142  |  108<H>  |  4<L>  ----------------------------<  74  4.4   |  23  |  0.52    12-26-23 @ 06:52      139  |  105  |  4<L>  ----------------------------<  93  3.4<L>   |  22  |  0.59        Ca    7.5<L>      27 Dec 2023 05:00  Ca    8.6      26 Dec 2023 16:05  Ca    8.8      26 Dec 2023 06:52  Mg     4.40<H>     12-27  Mg     3.70<H>     12-27  Mg     3.70<H>     12-26    TPro  5.4<L>  /  Alb  2.7<L>  /  TBili  0.9  /  DBili  x   /  AST  10  /  ALT  15  /  AlkPhos  138<H>  12-27-23 @ 05:00  TPro  6.3  /  Alb  3.0<L>  /  TBili  1.0  /  DBili  x   /  AST  9   /  ALT  17  /  AlkPhos  168<H>  12-26-23 @ 16:05  TPro  6.4  /  Alb  3.2<L>  /  TBili  0.9  /  DBili  x   /  AST  10  /  ALT  17  /  AlkPhos  168<H>  12-26-23 @ 06:52          PE:  General: NAD  Abdomen: Soft, appropriately tender, incision c/d/i.  Extremities: No erythema, no pitting edema     OB Progress Note: LTCS, POD#2    S: 27yo POD#2 s/p pLTCS 2/2 TIUP (Breech, Transverse) c/b spEC s/p Mg. Pain is well controlled. She is tolerating a regular diet and passing flatus. She is voiding spontaneously, and ambulating without difficulty. Denies CP/SOB. Denies lightheadedness/dizziness. Denies N/V. Denies HA, vision changes, epigastric pain.    O:  Vitals:  Vital Signs Last 24 Hrs  T(C): 37 (28 Dec 2023 01:09), Max: 37.4 (27 Dec 2023 22:37)  T(F): 98.6 (28 Dec 2023 01:09), Max: 99.3 (27 Dec 2023 22:37)  HR: 79 (28 Dec 2023 01:09) (77 - 91)  BP: 127/60 (28 Dec 2023 01:09) (115/55 - 143/64)  BP(mean): --  RR: 18 (28 Dec 2023 01:09) (18 - 18)  SpO2: 100% (28 Dec 2023 01:09) (98% - 100%)    Parameters below as of 27 Dec 2023 14:00  Patient On (Oxygen Delivery Method): room air        MEDICATIONS  (STANDING):  acetaminophen     Tablet .. 975 milliGRAM(s) Oral <User Schedule>  diphtheria/tetanus/pertussis (acellular) Vaccine (Adacel) 0.5 milliLiter(s) IntraMuscular once  ferrous    sulfate 325 milliGRAM(s) Oral daily  heparin   Injectable 5000 Unit(s) SubCutaneous every 12 hours  ibuprofen  Tablet. 600 milliGRAM(s) Oral every 6 hours  influenza   Vaccine 0.5 milliLiter(s) IntraMuscular once  lactated ringers. 1000 milliLiter(s) (50 mL/Hr) IV Continuous <Continuous>      MEDICATIONS  (PRN):  albuterol    90 MICROgram(s) HFA Inhaler 2 Puff(s) Inhalation every 6 hours PRN Shortness of Breath and/or Wheezing  dexAMETHasone  Injectable 4 milliGRAM(s) IV Push every 6 hours PRN Nausea  diphenhydrAMINE 25 milliGRAM(s) Oral every 6 hours PRN Pruritus  lanolin Ointment 1 Application(s) Topical every 6 hours PRN Sore Nipples  magnesium hydroxide Suspension 30 milliLiter(s) Oral two times a day PRN Constipation  nalbuphine Injectable 2.5 milliGRAM(s) IV Push every 6 hours PRN Pruritus  naloxone Injectable 0.1 milliGRAM(s) IV Push every 3 minutes PRN For ANY of the following changes in patient status:  A. Breaths Per Minute LESS THAN 10, B. Oxygen saturation LESS THAN 90%, C. Sedation score of 6 for Stop After: 4 Times  ondansetron Injectable 4 milliGRAM(s) IV Push every 6 hours PRN Nausea  oxyCODONE    IR 5 milliGRAM(s) Oral every 3 hours PRN Moderate to Severe Pain (4-10)  oxyCODONE    IR 5 milliGRAM(s) Oral once PRN Moderate to Severe Pain (4-10)  simethicone 80 milliGRAM(s) Chew every 4 hours PRN Gas      Labs:  Blood type: A Positive  Rubella IgG: RPR: Negative                          10.5<L>   12.60<H> >-----------< 214    ( 12-27 @ 05:00 )             30.4<L>                        12.3   12.75<H> >-----------< 236    ( 12-26 @ 16:05 )             37.3                        11.8   8.29 >-----------< 221    ( 12-26 @ 06:52 )             35.2    12-27-23 @ 05:00      135  |  102  |  5<L>  ----------------------------<  72  3.7   |  22  |  0.63    12-26-23 @ 16:05      142  |  108<H>  |  4<L>  ----------------------------<  74  4.4   |  23  |  0.52    12-26-23 @ 06:52      139  |  105  |  4<L>  ----------------------------<  93  3.4<L>   |  22  |  0.59        Ca    7.5<L>      27 Dec 2023 05:00  Ca    8.6      26 Dec 2023 16:05  Ca    8.8      26 Dec 2023 06:52  Mg     4.40<H>     12-27  Mg     3.70<H>     12-27  Mg     3.70<H>     12-26    TPro  5.4<L>  /  Alb  2.7<L>  /  TBili  0.9  /  DBili  x   /  AST  10  /  ALT  15  /  AlkPhos  138<H>  12-27-23 @ 05:00  TPro  6.3  /  Alb  3.0<L>  /  TBili  1.0  /  DBili  x   /  AST  9   /  ALT  17  /  AlkPhos  168<H>  12-26-23 @ 16:05  TPro  6.4  /  Alb  3.2<L>  /  TBili  0.9  /  DBili  x   /  AST  10  /  ALT  17  /  AlkPhos  168<H>  12-26-23 @ 06:52          PE:  General: NAD  Abdomen: Soft, appropriately tender, incision c/d/i.  Extremities: No erythema, no pitting edema

## 2023-12-28 NOTE — PROGRESS NOTE ADULT - ASSESSMENT
A/P: 27yo POD#2 s/p pLTCS 2/2 TUIP (Breech, Transverse) c/b spEC s/p Mg.  Patient is stable and doing well post-operatively.      #sPEC  - s/p Mg, [id number]   - No standing hypertensive medications   - Denies severe features   - BPs overnight 120s-143/50-70s  -     #Postpartum  - QBL: 1182  - Hct: 35.2->37.3->30.4  - Continue regular diet.  - Increase ambulation.  - Continue motrin, tylenol, oxycodone PRN for pain control.     Ale Bravo, PGY-1 A/P: 27yo POD#2 s/p pLTCS 2/2 TUIP (Breech, Transverse) c/b spEC s/p Mg.  Patient is stable and doing well post-operatively.      #sPEC  - s/p Mg, [id number]   - No standing hypertensive medications   - Denies severe features   - BPs overnight 120s-143/50-70s  -     #Postpartum  - QBL: 1182  - Hct: 35.2->37.3->30.4  - Continue regular diet.  - Increase ambulation.  - Continue motrin, tylenol, oxycodone PRN for pain control.     Ale Bravo, PGY-1 A/P: 27yo POD#2 s/p pLTCS 2/2 TIUP (Breech, Transverse) c/b spEC s/p Mg.  Patient is stable and doing well post-operatively.      #sPEC  - s/p Mg, [id number]   - No standing hypertensive medications   - Denies severe features   - BPs overnight 120s-143/50-70s  -     #Postpartum  - QBL: 1182  - Hct: 35.2->37.3->30.4  - Continue regular diet.  - Increase ambulation.  - Continue motrin, tylenol, oxycodone PRN for pain control.     Ale Bravo, PGY-1 A/P: 29yo POD#2 s/p pLTCS 2/2 TIUP (Breech, Transverse) c/b spEC s/p Mg.  Patient is stable and doing well post-operatively.      #sPEC  - s/p Mg, [id number]   - No standing hypertensive medications   - Denies severe features   - BPs overnight 120s-143/50-70s  -     #Postpartum  - QBL: 1182  - Hct: 35.2->37.3->30.4  - Continue regular diet.  - Increase ambulation.  - Continue motrin, tylenol, oxycodone PRN for pain control.     Ale Bravo, PGY-1 A/P: 29yo POD#2 s/p pLTCS 2/2 TIUP (Breech, Transverse) c/b spEC s/p Mg.  Patient is stable and doing well post-operatively.      #sPEC  - s/p Mg, [id number]   - No standing hypertensive medications   - Denies severe features   - BPs overnight 120s-143/50-70s  -     #Postpartum  - QBL: 1182  - Hct: 35.2->37.3->30.4  - Continue regular diet.  - Increase ambulation.  - Continue motrin, tylenol, oxycodone PRN for pain control.     Ale Bravo, PGY-1    Agree with above findings. POD#2. 29yo  s/p PCS due Mono/di twin complicated by PreE w/ SF. s/p MgSO4. s/p izaguirre. Ambulating and eating regular food and urinating without difficulty. Passed flatus. Minimal lochia. VSS. Labs are stable  Cont POD care  Encourage breastfeeding, ambulation, iss use, and abdominal binder  f/u bps  Anticipate discharge tomorrow  Evelina Gonzalez MD MPH A/P: 27yo POD#2 s/p pLTCS 2/2 TIUP (Breech, Transverse) c/b spEC s/p Mg.  Patient is stable and doing well post-operatively.      #sPEC  - s/p Mg, [id number]   - No standing hypertensive medications   - Denies severe features   - BPs overnight 120s-143/50-70s  -     #Postpartum  - QBL: 1182  - Hct: 35.2->37.3->30.4  - Continue regular diet.  - Increase ambulation.  - Continue motrin, tylenol, oxycodone PRN for pain control.     Ale Bravo, PGY-1    Agree with above findings. POD#2. 27yo  s/p PCS due Mono/di twin complicated by PreE w/ SF. s/p MgSO4. s/p izaguirre. Ambulating and eating regular food and urinating without difficulty. Passed flatus. Minimal lochia. VSS. Labs are stable  Cont POD care  Encourage breastfeeding, ambulation, iss use, and abdominal binder  f/u bps  Anticipate discharge tomorrow  Evelina Gonzalez MD MPH

## 2023-12-29 ENCOUNTER — APPOINTMENT (OUTPATIENT)
Dept: ANTEPARTUM | Facility: CLINIC | Age: 28
End: 2023-12-29

## 2023-12-29 ENCOUNTER — TRANSCRIPTION ENCOUNTER (OUTPATIENT)
Age: 28
End: 2023-12-29

## 2023-12-29 VITALS
DIASTOLIC BLOOD PRESSURE: 69 MMHG | RESPIRATION RATE: 18 BRPM | OXYGEN SATURATION: 100 % | TEMPERATURE: 99 F | SYSTOLIC BLOOD PRESSURE: 132 MMHG | HEART RATE: 89 BPM

## 2023-12-29 RX ORDER — SIMETHICONE 80 MG/1
1 TABLET, CHEWABLE ORAL
Qty: 0 | Refills: 0 | DISCHARGE
Start: 2023-12-29

## 2023-12-29 RX ORDER — FERROUS SULFATE 325(65) MG
1 TABLET ORAL
Qty: 0 | Refills: 0 | DISCHARGE
Start: 2023-12-29

## 2023-12-29 RX ORDER — IBUPROFEN 200 MG
1 TABLET ORAL
Qty: 0 | Refills: 0 | DISCHARGE
Start: 2023-12-29

## 2023-12-29 RX ORDER — LANOLIN
1 OINTMENT (GRAM) TOPICAL
Qty: 0 | Refills: 0 | DISCHARGE
Start: 2023-12-29

## 2023-12-29 RX ORDER — ALBUTEROL 90 UG/1
1 AEROSOL, METERED ORAL
Qty: 0 | Refills: 0 | DISCHARGE

## 2023-12-29 RX ORDER — ACETAMINOPHEN 500 MG
3 TABLET ORAL
Qty: 0 | Refills: 0 | DISCHARGE
Start: 2023-12-29

## 2023-12-29 RX ORDER — ASPIRIN/CALCIUM CARB/MAGNESIUM 324 MG
1 TABLET ORAL
Refills: 0 | DISCHARGE

## 2023-12-29 RX ADMIN — Medication 975 MILLIGRAM(S): at 10:43

## 2023-12-29 RX ADMIN — Medication 600 MILLIGRAM(S): at 05:16

## 2023-12-29 RX ADMIN — Medication 975 MILLIGRAM(S): at 15:45

## 2023-12-29 RX ADMIN — Medication 975 MILLIGRAM(S): at 03:40

## 2023-12-29 RX ADMIN — Medication 975 MILLIGRAM(S): at 15:15

## 2023-12-29 RX ADMIN — Medication 975 MILLIGRAM(S): at 03:05

## 2023-12-29 RX ADMIN — Medication 600 MILLIGRAM(S): at 05:52

## 2023-12-29 RX ADMIN — Medication 325 MILLIGRAM(S): at 11:55

## 2023-12-29 RX ADMIN — Medication 600 MILLIGRAM(S): at 11:55

## 2023-12-29 RX ADMIN — Medication 600 MILLIGRAM(S): at 12:45

## 2023-12-29 RX ADMIN — Medication 975 MILLIGRAM(S): at 11:30

## 2023-12-29 RX ADMIN — Medication 600 MILLIGRAM(S): at 00:20

## 2023-12-29 NOTE — DISCHARGE NOTE OB - CARE PROVIDER_API CALL
Evelina Gonzalez  Obstetrics and Gynecology  [address] [place] [address]  Phone: [phone]  Fax: [phone]  Established Patient  Follow Up Time: 2 weeks   Evelina Gonzalez  Obstetrics and Gynecology  [address] [place] [address]  Phone: [phone]  Fax: [phone]  Established Patient  Follow Up Time: 2 weeks

## 2023-12-29 NOTE — DISCHARGE NOTE OB - HOSPITAL COURSE
admitted for pcs. pcs uncomplicated. pregnancy complicated by preeclampsia with sf. pod course uncomplicated

## 2023-12-29 NOTE — DISCHARGE NOTE OB - CARE PLAN
Principal Discharge DX:	Status post primary low transverse  section  Assessment and plan of treatment:	Agree with above findings. POD#3. 29yo  s/p PCS due Mono/di twin complicated by PreE w/ SF. s/p MgSO4. s/p izaguirre. Ambulating and eating regular food and urinating without difficulty. Passed flatus. Minimal lochia. VSS. Labs are stable  Cont POD care  Encourage breastfeeding, ambulation, iss use, and abdominal binder  f/u bps  asthma- stable. cont home meds  Anticipate discharge today  Evelina Gonzalez MD MPH   Principal Discharge DX:	Status post primary low transverse  section  Assessment and plan of treatment:	Agree with above findings. POD#3. 27yo  s/p PCS due Mono/di twin complicated by PreE w/ SF. s/p MgSO4. s/p izaguirre. Ambulating and eating regular food and urinating without difficulty. Passed flatus. Minimal lochia. VSS. Labs are stable  Cont POD care  Encourage breastfeeding, ambulation, iss use, and abdominal binder  f/u bps  asthma- stable. cont home meds  Anticipate discharge today  Evelina Gonzalez MD MPH   1

## 2023-12-29 NOTE — PROGRESS NOTE ADULT - ASSESSMENT
A/P: 29yo POD#3 s/p pLTCS 2/2 TIUP (Breech, Transverse) c/b spEC s/p Mg.  Patient is stable and doing well post-operatively.      #sPEC  - s/p Mg, [id number]   - No standing hypertensive medications   - Denies severe features   - BPs overnight 120-130/60-70      #Postpartum  - QBL: 1182  - Hct: 35.2->37.3->30.4  - Continue regular diet.  - Increase ambulation.  - Continue motrin, tylenol, oxycodone PRN for pain control.   - Discharge planning     Ale Bravo, PGY-1   A/P: 27yo POD#3 s/p pLTCS 2/2 TIUP (Breech, Transverse) c/b spEC s/p Mg.  Patient is stable and doing well post-operatively.      #sPEC  - s/p Mg, [id number]   - No standing hypertensive medications   - Denies severe features   - BPs overnight 120-130/60-70      #Postpartum  - QBL: 1182  - Hct: 35.2->37.3->30.4  - Continue regular diet.  - Increase ambulation.  - Continue motrin, tylenol, oxycodone PRN for pain control.   - Discharge planning     Ale Bravo, PGY-1   A/P: 29yo POD#3 s/p pLTCS 2/2 TIUP (Breech, Transverse) c/b spEC s/p Mg.  Patient is stable and doing well post-operatively.      #sPEC  - s/p Mg, [id number]   - No standing hypertensive medications   - Denies severe features   - BPs overnight 120-130/60-70      #Postpartum  - QBL: 1182  - Hct: 35.2->37.3->30.4  - Continue regular diet.  - Increase ambulation.  - Continue motrin, tylenol, oxycodone PRN for pain control.   - Discharge planning     Ale Bravo, PGY-1    Agree with above findings. POD#3. 29yo  s/p PCS due Mono/di twin complicated by PreE w/ SF. s/p MgSO4. s/p izaguirre. Ambulating and eating regular food and urinating without difficulty. Passed flatus. Minimal lochia. VSS. Labs are stable  Cont POD care  Encourage breastfeeding, ambulation, iss use, and abdominal binder  f/u bps  Anticipate discharge today  Evelina Gonzalez MD MPH   A/P: 29yo POD#3 s/p pLTCS 2/2 TIUP (Breech, Transverse) c/b spEC s/p Mg.  Patient is stable and doing well post-operatively.      #sPEC  - s/p Mg, [id number]   - No standing hypertensive medications   - Denies severe features   - BPs overnight 120-130/60-70      #Postpartum  - QBL: 1182  - Hct: 35.2->37.3->30.4  - Continue regular diet.  - Increase ambulation.  - Continue motrin, tylenol, oxycodone PRN for pain control.   - Discharge planning     Ale Bravo, PGY-1    Agree with above findings. POD#3. 29yo  s/p PCS due Mono/di twin complicated by PreE w/ SF. s/p MgSO4. s/p izaguirre. Ambulating and eating regular food and urinating without difficulty. Passed flatus. Minimal lochia. VSS. Labs are stable  Cont POD care  Encourage breastfeeding, ambulation, iss use, and abdominal binder  f/u bps  Anticipate discharge today  Evelina Gonzalez MD MPH

## 2023-12-29 NOTE — DISCHARGE NOTE OB - PATIENT PORTAL LINK FT
You can access the FollowMyHealth Patient Portal offered by [facility] by registering at the following website: [email or web address]. By joining [employer]’s FollowMyHealth portal, you will also be able to view your health information using other applications (apps) compatible with our system. You can access the FollowMyHealth Patient Portal offered by [facility] by registering at the following website: [email or web address]. By joining [email or web address]’s FollowMyHealth portal, you will also be able to view your health information using other applications (apps) compatible with our system.

## 2023-12-29 NOTE — DISCHARGE NOTE OB - PROVIDER TOKENS
PROVIDER:[TOKEN:[[id number]],FOLLOWUP:[2 weeks],ESTABLISHEDPATIENT:[T]] PROVIDER:[TOKEN:[[id number]],FOLLOWUP:[2 weeks],ESTABLISHEDPATIENT:[T]]

## 2023-12-29 NOTE — DISCHARGE NOTE OB - MEDICATION SUMMARY - MEDICATIONS TO TAKE
I will START or STAY ON the medications listed below when I get home from the hospital:    Blood Pressure monitor  -- Electronic Blood Pressure X 1  Please check blood pressures three times a day.  Notify MD for blood pressures greater than 140/90. Return to hospital for blood pressures greater than 160/110.  -- Indication: For Status post primary low transverse  section    ibuprofen 600 mg oral tablet  -- 1 tab(s) by mouth every 6 hours as needed for  moderate pain  -- Indication: For Status post primary low transverse  section    acetaminophen 325 mg oral tablet  -- 3 tab(s) by mouth 4 times a day as needed for  moderate pain  -- Indication: For Status post primary low transverse  section    albuterol 2.5 mg/3 mL (0.083%) inhalation solution  -- 3 milliliter(s) inhaled 3 times a day, As Needed -for shortness of breath and/or wheezing   -- For inhalation only.  It is very important that you take or use this exactly as directed.  Do not skip doses or discontinue unless directed by your doctor.  Obtain medical advice before taking any non-prescription drugs as some may affect the action of this medication.    -- Indication: For Status post primary low transverse  section    Albuterol (Eqv-ProAir HFA) 90 mcg/inh inhalation aerosol  -- 1 puff(s) inhaled 4 times a day as needed for  shortness of breath and/or wheezing  -- Indication: For Status post primary low transverse  section    Symbicort 80 mcg-4.5 mcg/inh inhalation aerosol  -- 2 puff(s) inhaled once a day  -- Indication: For Status post primary low transverse  section    lanolin topical ointment  -- 1 Apply on skin to affected area every 6 hours As needed Sore Nipples  -- Indication: For Status post primary low transverse  section    PNV Prenatal oral tablet  -- 1 by mouth once a day  -- Indication: For Status post primary low transverse  section    ferrous sulfate 325 mg (65 mg elemental iron) oral tablet  -- 1 tab(s) by mouth once a day  -- Indication: For Status post primary low transverse  section    simethicone 80 mg oral tablet, chewable  -- 1 tab(s) by mouth every 4 hours As needed Gas  -- Indication: For Status post primary low transverse  section

## 2023-12-29 NOTE — DISCHARGE NOTE OB - MATERIALS PROVIDED
Vaccinations/[facility]  Screening Program/  Immunization Record/Breastfeeding Log/Bottle Feeding Log/Breastfeeding Mother’s Support Group Information/Guide to Postpartum Care/[facility] Hearing Screen Program/Back To Sleep Handout/Shaken Baby Prevention Handout/Breastfeeding Guide and Packet Vaccinations/[facility]  Screening Program/  Immunization Record/Breastfeeding Log/Bottle Feeding Log/Breastfeeding Mother’s Support Group Information/Guide to Postpartum Care/[facility] Hearing Screen Program/Back To Sleep Handout/Shaken Baby Prevention Handout/Breastfeeding Guide and Packet

## 2023-12-29 NOTE — DISCHARGE NOTE OB - PLAN OF CARE
Agree with above findings. POD#3. 27yo  s/p PCS due Mono/di twin complicated by PreE w/ SF. s/p MgSO4. s/p izaguirre. Ambulating and eating regular food and urinating without difficulty. Passed flatus. Minimal lochia. VSS. Labs are stable  Cont POD care  Encourage breastfeeding, ambulation, iss use, and abdominal binder  f/u bps  asthma- stable. cont home meds  Anticipate discharge today  Evelina Gonzalez MD MPH

## 2023-12-29 NOTE — DISCHARGE NOTE OB - NS MD DC FALL RISK RISK
For information on Fall & Injury Prevention, visit: [email or web address] OR  [email or web address] OR  https://www.cdc.gov/steadi/patient.html For information on Fall & Injury Prevention, visit: [email or web address] OR  [email or web address] OR  https://www.cdc.gov/steadi/patient.html

## 2023-12-29 NOTE — PROGRESS NOTE ADULT - SUBJECTIVE AND OBJECTIVE BOX
OB Postpartum Note:  Delivery, POD#3    S: 27yo POD#3 s/p  pLTCS 2/2 TIUP (Breech, Transverse) c/b spEC s/p Mg. The patient feels well.  Pain is well controlled. She is tolerating a regular diet and passing flatus. She is voiding spontaneously, and ambulating without difficulty. Denies CP/SOB. Denies lightheadedness/dizziness. Denies N/V. Denies HA, vision changes, epigastric pain    O:  Vitals:  Vital Signs Last 24 Hrs  T(C): 36.7 (29 Dec 2023 05:39), Max: 37.2 (28 Dec 2023 13:46)  T(F): 98 (29 Dec 2023 05:39), Max: 98.9 (28 Dec 2023 13:46)  HR: 78 (29 Dec 2023 05:39) (78 - 100)  BP: 126/68 (29 Dec 2023 05:39) (124/63 - 134/68)  BP(mean): --  RR: 18 (29 Dec 2023 05:39) (18 - 19)  SpO2: 100% (29 Dec 2023 05:39) (99% - 100%)    Parameters below as of 28 Dec 2023 21:28  Patient On (Oxygen Delivery Method): room air        MEDICATIONS  (STANDING):  acetaminophen     Tablet .. 975 milliGRAM(s) Oral <User Schedule>  diphtheria/tetanus/pertussis (acellular) Vaccine (Adacel) 0.5 milliLiter(s) IntraMuscular once  ferrous    sulfate 325 milliGRAM(s) Oral daily  heparin   Injectable 5000 Unit(s) SubCutaneous every 12 hours  ibuprofen  Tablet. 600 milliGRAM(s) Oral every 6 hours  influenza   Vaccine 0.5 milliLiter(s) IntraMuscular once    MEDICATIONS  (PRN):  albuterol    90 MICROgram(s) HFA Inhaler 2 Puff(s) Inhalation every 6 hours PRN Shortness of Breath and/or Wheezing  diphenhydrAMINE 25 milliGRAM(s) Oral every 6 hours PRN Pruritus  lanolin Ointment 1 Application(s) Topical every 6 hours PRN Sore Nipples  magnesium hydroxide Suspension 30 milliLiter(s) Oral two times a day PRN Constipation  oxyCODONE    IR 5 milliGRAM(s) Oral every 3 hours PRN Moderate to Severe Pain (4-10)  oxyCODONE    IR 5 milliGRAM(s) Oral once PRN Moderate to Severe Pain (4-10)  simethicone 80 milliGRAM(s) Chew every 4 hours PRN Gas      LABS:  Blood type: A Positive  Rubella IgG: RPR: Negative                          10.5<L>   12.60<H> >-----------< 214    (  @ 05:00 )             30.4<L>                        12.3   12.75<H> >-----------< 236    (  @ 16:05 )             37.3    23 @ 05:00      135  |  102  |  5<L>  ----------------------------<  72  3.7   |  22  |  0.63    23 @ 16:05      142  |  108<H>  |  4<L>  ----------------------------<  74  4.4   |  23  |  0.52        Ca    7.5<L>      27 Dec 2023 05:00  Ca    8.6      26 Dec 2023 16:05  Mg     4.40<H>       Mg     3.70<H>       Mg     3.70<H>         TPro  5.4<L>  /  Alb  2.7<L>  /  TBili  0.9  /  DBili  x   /  AST  10  /  ALT  15  /  AlkPhos  138<H>  23 @ 05:00  TPro  6.3  /  Alb  3.0<L>  /  TBili  1.0  /  DBili  x   /  AST  9   /  ALT  17  /  AlkPhos  168<H>  23 @ 16:05          Physical exam:  Gen: NAD  Abdomen: Soft, nontender, no distension , firm uterine fundus at umbilicus.  Incision: Clean, dry, and intact   Pelvic: Normal lochia noted  Ext: No calf tenderness, +1 edema

## 2023-12-30 ENCOUNTER — TRANSCRIPTION ENCOUNTER (OUTPATIENT)
Age: 28
End: 2023-12-30

## 2024-01-01 ENCOUNTER — TRANSCRIPTION ENCOUNTER (OUTPATIENT)
Age: 29
End: 2024-01-01

## 2024-01-02 ENCOUNTER — NON-APPOINTMENT (OUTPATIENT)
Age: 29
End: 2024-01-02

## 2024-01-02 ENCOUNTER — APPOINTMENT (OUTPATIENT)
Dept: ANTEPARTUM | Facility: CLINIC | Age: 29
End: 2024-01-02

## 2024-01-02 DIAGNOSIS — O30.009 TWIN PREGNANCY, UNSPECIFIED NUMBER OF PLACENTA AND UNSPECIFIED NUMBER OF AMNIOTIC SACS, UNSPECIFIED TRIMESTER: ICD-10-CM

## 2024-01-02 PROBLEM — D57.3 SICKLE-CELL TRAIT: Chronic | Status: ACTIVE | Noted: 2023-12-20

## 2024-01-02 PROBLEM — O30.039 TWIN PREGNANCY, MONOCHORIONIC/DIAMNIOTIC, UNSPECIFIED TRIMESTER: Chronic | Status: ACTIVE | Noted: 2023-12-20

## 2024-01-02 RX ORDER — ALBUTEROL SULFATE 2.5 MG/3ML
(2.5 MG/3ML) SOLUTION RESPIRATORY (INHALATION) 4 TIMES DAILY
Qty: 360 | Refills: 1 | Status: DISCONTINUED | COMMUNITY
Start: 2020-10-29 | End: 2024-01-02

## 2024-01-02 RX ORDER — BUDESONIDE AND FORMOTEROL FUMARATE DIHYDRATE 80; 4.5 UG/1; UG/1
80-4.5 AEROSOL RESPIRATORY (INHALATION) TWICE DAILY
Qty: 1 | Refills: 3 | Status: DISCONTINUED | COMMUNITY
Start: 2020-10-29 | End: 2024-01-02

## 2024-01-02 RX ORDER — ALBUTEROL SULFATE 90 UG/1
108 (90 BASE) INHALANT RESPIRATORY (INHALATION) EVERY 4 HOURS
Qty: 1 | Refills: 5 | Status: DISCONTINUED | COMMUNITY
Start: 2023-08-16 | End: 2024-01-02

## 2024-01-02 RX ORDER — FLUTICASONE PROPIONATE 50 UG/1
50 SPRAY, METERED NASAL DAILY
Qty: 1 | Refills: 5 | Status: DISCONTINUED | COMMUNITY
Start: 2020-10-29 | End: 2024-01-02

## 2024-01-02 RX ORDER — BUDESONIDE AND FORMOTEROL FUMARATE DIHYDRATE 80; 4.5 UG/1; UG/1
80-4.5 AEROSOL RESPIRATORY (INHALATION) TWICE DAILY
Qty: 1 | Refills: 5 | Status: DISCONTINUED | COMMUNITY
Start: 2023-05-04 | End: 2024-01-02

## 2024-01-05 ENCOUNTER — APPOINTMENT (OUTPATIENT)
Dept: ANTEPARTUM | Facility: CLINIC | Age: 29
End: 2024-01-05

## 2024-01-08 ENCOUNTER — NON-APPOINTMENT (OUTPATIENT)
Age: 29
End: 2024-01-08

## 2024-01-13 LAB
SURGICAL PATHOLOGY STUDY: SIGNIFICANT CHANGE UP
SURGICAL PATHOLOGY STUDY: SIGNIFICANT CHANGE UP

## 2024-01-16 ENCOUNTER — NON-APPOINTMENT (OUTPATIENT)
Age: 29
End: 2024-01-16

## 2024-01-24 ENCOUNTER — NON-APPOINTMENT (OUTPATIENT)
Age: 29
End: 2024-01-24

## 2024-02-06 ENCOUNTER — APPOINTMENT (OUTPATIENT)
Dept: CARDIOLOGY | Facility: CLINIC | Age: 29
End: 2024-02-06

## 2024-02-06 NOTE — HISTORY OF PRESENT ILLNESS
[FreeTextEntry1] : Ms. MAGALYS RUFF 28 year - old F is here Feb 06, 2024 to establish care in the [facility] program.

## 2024-04-09 NOTE — OB RN INTRAOPERATIVE NOTE - NS_VAGINABETAPREP_OBGYN_ALL_OB
Spoke to pt who was informed by Rebeca that the following medication:    omega-3-acid ethyl esters 1 g Oral Cap     Requires a prior authorization for the new year.  Apparently this medication is holding waiting for prior authorization and Rebeca informed the pt this request has been faxed several times to our office.   No

## 2024-05-11 NOTE — ED PROVIDER NOTE - CLINICAL SUMMARY MEDICAL DECISION MAKING FREE TEXT BOX
Left message to return call     Please ask Nathalie to leave her blood sugar readings so Alyx can review them tomorrow when she is back in the office.    Patient called wanting to discuss A1C results and if any medication changes are necessary.   See duplicate telephone encounter from 8/24/21   26-year-old female with past medical history as above, presents complaining of shortness of breath, chest pain, dry cough x2 days.  H&P most consistent with asthma exacerbation.    Patient is moving air well without any obvious wheezing, but states she still subjectively feels somewhat short of breath and this feels similar to prior asthma exacerbations. No other viral URI symptoms but will get flu/COVID swab, give meds, reassess. show

## 2024-06-11 NOTE — OB RN INTRAOPERATIVE NOTE - NS_ELECTROSURGICALUNITBIOMEDNUMBER_OBGYN_ALL_OB_FT
Palpitations.  Patient will follow-up with cardiologist.  She was reluctant to consider starting any medication such as calcium channel blockers.  She will discuss further with cardiologist after obtaining 48-hour Holter monitor and outpatient stress test   [id number]

## 2024-08-06 ENCOUNTER — APPOINTMENT (OUTPATIENT)
Dept: PULMONOLOGY | Facility: CLINIC | Age: 29
End: 2024-08-06

## 2024-08-06 ENCOUNTER — LABORATORY RESULT (OUTPATIENT)
Age: 29
End: 2024-08-06

## 2024-08-06 PROBLEM — J30.9 ALLERGIC RHINITIS, UNSPECIFIED SEASONALITY, UNSPECIFIED TRIGGER: Status: ACTIVE | Noted: 2024-08-06

## 2024-08-06 PROCEDURE — 99214 OFFICE O/P EST MOD 30 MIN: CPT

## 2024-08-06 PROCEDURE — 99204 OFFICE O/P NEW MOD 45 MIN: CPT

## 2024-08-06 PROCEDURE — G2211 COMPLEX E/M VISIT ADD ON: CPT | Mod: NC

## 2024-08-06 NOTE — HISTORY OF PRESENT ILLNESS
[TextBox_4] : 28-year-old woman previously seen for asthma during pregnancy.  Her twin daughters are now 7 months old.  Of note during her pregnancy she felt her asthma was well-controlled.  She actually feels that breathing has gotten significantly worse since she delivered.  She endorses daily symptoms of chest tightness and dyspnea.  Occasional wheezing.  No recent ED visits or hospitalizations.  No recent prednisone use.  She has an albuterol inhaler which she uses as several times a week.  She also has nasal congestion and postnasal drip.  She has run out of Symbicort and has not used it in many weeks.  Triggers for asthma include heat, cold/weather change.  Feels she does have allergies but she is unclear if she has specific allergies.  Previous testing in 2020 showed allergies to dust.  Asthma feels worse when she goes to work.  Works in the school.  Non-smoker.

## 2024-08-06 NOTE — ASSESSMENT
[FreeTextEntry1] : 28-year-old woman previously seen for asthma during pregnancy.  Her twin daughters are now 7 months old.  Asthma has been poorly controlled recently.  She has daily symptoms.  However she is no longer on controller medication and is only using albuterol as needed.  She has significant postnasal drip, likely allergic rhinitis.  -Start Symbicort 80/4.52 puffs twice daily standing -Albuterol as needed -Add Flonase to each nostril daily -Azelastine 1 puff every 12 daily -Repeat PFTs -Asthma labs as below -Cetirizine as needed   I spent 39minutes during this encounter. Total time excludes separate billing services.

## 2024-08-06 NOTE — PHYSICAL EXAM
[No Acute Distress] : no acute distress [Turbinate hypertrophy] : turbinate hypertrophy [Normal Appearance] : normal appearance [Normal Rate/Rhythm] : normal rate/rhythm [Normal S1, S2] : normal s1, s2 [No Resp Distress] : no resp distress [Clear to Auscultation Bilaterally] : clear to auscultation bilaterally [No Focal Deficits] : no focal deficits [Oriented x3] : oriented x3 [Normal Affect] : normal affect

## 2024-10-28 ENCOUNTER — NON-APPOINTMENT (OUTPATIENT)
Age: 29
End: 2024-10-28

## 2024-12-08 ENCOUNTER — NON-APPOINTMENT (OUTPATIENT)
Age: 29
End: 2024-12-08

## 2024-12-30 NOTE — OB RN PATIENT PROFILE - BILL OF RIGHTS/ADMISSION INFORMATION PROVIDED TO:
Physical Therapy    Visit Type: initial evaluation and treatment  SUBJECTIVE  Agreeable to session.   Reports she is supposed to be wearing O2 at night but doesn't always do that.   Patient has not been hospitalized, in a skilled nursing facility, or seen by home health in the last 30 days.  Patient / Family Goal: return home    Pain   Patient denies pain.     OBJECTIVE     Cognitive Status   Level of Consciousness   - alert  Affect/Behavior    - cooperative and pleasant  Orientation    - Oriented to: person, place, time and situation  Functional Communication   - Overall Communication Status: within functional limits   - Forms of Communication: verbal  Attention Span    - Attention: intact  Following Direction   - follows all commands and directions consistently    Patient Activity Tolerance: 1 to 1 activity to rest      Range of Motion (ROM)   (degrees unless noted; active unless noted; norms in ( ); negative=lacking to 0, positive=beyond 0)  WFL: LLE, RLE    Strength  (out of 5 unless noted, standard test position unless noted)   WFL: LLE, RLE        Bed Mobility  Seated at the edge of the bed  Transfers  Assistive devices: gait belt, 2-wheeled walker  - Sit to stand: modified independent  - Stand to sit: modified independent  Performs transfers both with and without the walker during session without assistance.    Ambulation / Gait  - Assistive device: gait belt  - Distance (feet unless otherwise indicated): 40, 400    In room ambulation initially to assess O2 sats - patient dropped to 86% on RA for short distance.   In [place], patient ambulates very quickly and at times seems a bit impulsive however she did not have any LOB during gait. She had VENTURA and was at 88% on 2L after ambulation.   Trial with and without the walker however the patient tends to carry the walker.       Interventions     Training provided: bed mobility training, functional ambulation, transfer training, use of assistive device, safety  training and activity tolerance  Educated on O2 sats and oxygen needs. Also, alerted MD regarding findings.   Skilled input: Verbal instruction/cues  Verbal Consent: Writer verbally educated and received verbal consent for hand placement, positioning of patient, and techniques to be performed today from patient for hand placement and palpation for techniques, clothing adjustments for techniques and therapist position for techniques as described above and how they are pertinent to the patient's plan of care.         Education:   - Present and ready to learn: patient  Education provided during session:  - Results of above outlined education: Verbalizes understanding and Needs reinforcement    ASSESSMENT   Patient will benefit from skilled therapy to address listed impairments and functional limitations.    Discharge needs based on today's assessment:   - Current level of function: slightly below baseline level of function   - Therapy needs at discharge: therapy 1-3 times per week (may or may not need further therapy)   - Activities of daily living (ADLs) requiring support at discharge: ambulation and stairs   - Impairments that require further therapy intervention: activity tolerance and safety awareness    AM-PAC  - Generalized Prior Level of Function: IND/MOD I ([facility] 22-24)       Key: MOD A=moderate assistance, IND/MOD I=independent/modified independent  - Generalized Current Level of Function     - Current Mobility Score: 21       AM-PAC Scoring Key= >21 Modified Independent; 20-21 Supervision; 18-19 Minimal assist; 13-17 Moderate assist; 9-12 Max assist; <9 Total assist        Predicted patient presentation: Low (stable) - Patient comorbidities and complexities, as defined above, will have little effect on progress for prescribed plan of care.    PLAN (while hospitalized)  Suggestions for next session as indicated: Dynamic gait, torres balance test, stair negotiation (2 harsha to enter home)  PT Frequency: 6-7 x  per week      PT/OT Mobility Equipment for Discharge: has ww  Interventions: bed mobility, equipment eval/education, HEP train/position, patient/family training, safety education, stairs retraining, functional transfer training, balance, gait training and strengthening  Agreement to plan and goals: patient agrees with goals and treatment plan        GOALS  Review Date: 1/6/2025  Long Term Goals: (to be met by time of discharge from hospital)  Sit to supine: Patient will complete sit to supine modified independent.  Supine to sit: Patient will complete supine to sit modified independent.  Sit to stand: Patient will complete sit to stand transfer with least restrictive device, modified independent.   Stand to sit: Patient will complete stand to sit transfer with least restrictive device, modified independent.   Ambulation (even): Patient will ambulate on even surface for 200 feet with least restrictive device, modified independent.   Curb step: Patient will ambulate curb step with least restrictive device, modified independent.   Documented in the chart in the following areas: Prior Function. Assessment/Plan.      Patient at End of Session:   Location: in chair  Safety measures: alarm system in place/re-engaged, call light within reach, bed rails x2 and lines intact      Therapy procedure time and total treatment time can be found documented on the Time Entry flowsheet   patient does not want family members notified of this visit/Patient

## 2025-01-14 ENCOUNTER — NON-APPOINTMENT (OUTPATIENT)
Age: 30
End: 2025-01-14

## 2025-05-26 NOTE — ED ADULT NURSE NOTE - NS ED NURSE DC INFO COMPLEXITY
Verbalized Understanding/Simple: Patient demonstrates quick and easy understanding
balance training/bed mobility training/gait training/strengthening/transfer training

## 2025-06-05 NOTE — ED ADULT NURSE NOTE - NS ED NOTE  TALK SOMEONE YN
IUD Removal:  SUBJECTIVE:    Is a pregnancy test required: No.  Was a consent obtained?  Yes    Isabell Maza is a 39 year old female,, No LMP recorded. (Menstrual status: IUD). who presents today for IUD removal. Her current IUD was placed 2016 ago. She has not had problems with the IUD. She requests removal of the IUD because the IUD effectiveness has     Today's PHQ-2 Score:       2024     1:31 PM   PHQ-2 (  [employer])   Q1: Little interest or pleasure in doing things 0   Q2: Feeling down, depressed or hopeless 0   PHQ-2 Score 0   Q1: Little interest or pleasure in doing things Not at all   Q2: Feeling down, depressed or hopeless Not at all   PHQ-2 Score 0       PROCEDURE:    A speculum exam was performed and the cervix was visualized. The IUD string was not visualized. Using alligator forceps, the IUD stem was grasped and IUD removed intact.     POST PROCEDURE:    The patient tolerated the procedure well. Patient was discharged in stable condition.    Call if bleeding, pain or fever occur. and Birth control counseling given.    CAMILLE VILLEDA MD  
No

## 2025-08-13 ENCOUNTER — NON-APPOINTMENT (OUTPATIENT)
Age: 30
End: 2025-08-13

## 2025-08-19 ENCOUNTER — NON-APPOINTMENT (OUTPATIENT)
Age: 30
End: 2025-08-19

## 2025-08-21 ENCOUNTER — NON-APPOINTMENT (OUTPATIENT)
Age: 30
End: 2025-08-21

## 2025-09-17 ENCOUNTER — APPOINTMENT (OUTPATIENT)
Dept: ORTHOPEDIC SURGERY | Facility: CLINIC | Age: 30
End: 2025-09-17